# Patient Record
Sex: FEMALE | Race: WHITE | Employment: OTHER | URBAN - METROPOLITAN AREA
[De-identification: names, ages, dates, MRNs, and addresses within clinical notes are randomized per-mention and may not be internally consistent; named-entity substitution may affect disease eponyms.]

---

## 2017-01-12 PROCEDURE — 80053 COMPREHEN METABOLIC PANEL: CPT | Performed by: INTERNAL MEDICINE

## 2017-01-12 PROCEDURE — 36415 COLL VENOUS BLD VENIPUNCTURE: CPT | Performed by: INTERNAL MEDICINE

## 2017-01-12 PROCEDURE — 84443 ASSAY THYROID STIM HORMONE: CPT | Performed by: INTERNAL MEDICINE

## 2017-01-12 PROCEDURE — 80061 LIPID PANEL: CPT | Performed by: INTERNAL MEDICINE

## 2017-07-03 PROCEDURE — 85025 COMPLETE CBC W/AUTO DIFF WBC: CPT | Performed by: INTERNAL MEDICINE

## 2017-07-03 PROCEDURE — 80061 LIPID PANEL: CPT | Performed by: INTERNAL MEDICINE

## 2017-07-03 PROCEDURE — 80053 COMPREHEN METABOLIC PANEL: CPT | Performed by: INTERNAL MEDICINE

## 2017-07-03 PROCEDURE — 36415 COLL VENOUS BLD VENIPUNCTURE: CPT | Performed by: INTERNAL MEDICINE

## 2017-08-22 PROBLEM — R91.8 MULTIPLE PULMONARY NODULES: Status: ACTIVE | Noted: 2017-08-22

## 2017-08-22 PROCEDURE — 80053 COMPREHEN METABOLIC PANEL: CPT | Performed by: INTERNAL MEDICINE

## 2017-08-22 PROCEDURE — 82550 ASSAY OF CK (CPK): CPT | Performed by: INTERNAL MEDICINE

## 2017-08-22 PROCEDURE — 36415 COLL VENOUS BLD VENIPUNCTURE: CPT | Performed by: INTERNAL MEDICINE

## 2017-08-22 PROCEDURE — 85025 COMPLETE CBC W/AUTO DIFF WBC: CPT | Performed by: INTERNAL MEDICINE

## 2017-08-22 PROCEDURE — 84484 ASSAY OF TROPONIN QUANT: CPT | Performed by: INTERNAL MEDICINE

## 2018-01-26 PROBLEM — Z95.1 S/P CABG X 2: Status: ACTIVE | Noted: 2018-01-26

## 2019-10-10 ENCOUNTER — APPOINTMENT (OUTPATIENT)
Dept: GENERAL RADIOLOGY | Facility: HOSPITAL | Age: 79
End: 2019-10-10
Attending: EMERGENCY MEDICINE
Payer: MEDICARE

## 2019-10-10 ENCOUNTER — HOSPITAL ENCOUNTER (EMERGENCY)
Facility: HOSPITAL | Age: 79
Discharge: STILL ADMITTED OR ADMINISTRATIVE | End: 2019-10-10
Attending: EMERGENCY MEDICINE
Payer: MEDICARE

## 2019-10-10 VITALS
SYSTOLIC BLOOD PRESSURE: 118 MMHG | WEIGHT: 133 LBS | DIASTOLIC BLOOD PRESSURE: 84 MMHG | RESPIRATION RATE: 23 BRPM | HEIGHT: 61 IN | HEART RATE: 86 BPM | BODY MASS INDEX: 25.11 KG/M2 | OXYGEN SATURATION: 96 % | TEMPERATURE: 98 F

## 2019-10-10 DIAGNOSIS — R07.9 CHEST PAIN, RULE OUT ACUTE MYOCARDIAL INFARCTION: Primary | ICD-10-CM

## 2019-10-10 PROCEDURE — 84484 ASSAY OF TROPONIN QUANT: CPT | Performed by: EMERGENCY MEDICINE

## 2019-10-10 PROCEDURE — 71045 X-RAY EXAM CHEST 1 VIEW: CPT | Performed by: EMERGENCY MEDICINE

## 2019-10-10 PROCEDURE — 99285 EMERGENCY DEPT VISIT HI MDM: CPT

## 2019-10-10 PROCEDURE — 96365 THER/PROPH/DIAG IV INF INIT: CPT

## 2019-10-10 PROCEDURE — 93005 ELECTROCARDIOGRAM TRACING: CPT

## 2019-10-10 PROCEDURE — 93010 ELECTROCARDIOGRAM REPORT: CPT | Performed by: EMERGENCY MEDICINE

## 2019-10-10 PROCEDURE — 80048 BASIC METABOLIC PNL TOTAL CA: CPT | Performed by: EMERGENCY MEDICINE

## 2019-10-10 PROCEDURE — 85025 COMPLETE CBC W/AUTO DIFF WBC: CPT | Performed by: EMERGENCY MEDICINE

## 2019-10-10 PROCEDURE — 83880 ASSAY OF NATRIURETIC PEPTIDE: CPT | Performed by: EMERGENCY MEDICINE

## 2019-10-10 PROCEDURE — 83735 ASSAY OF MAGNESIUM: CPT | Performed by: EMERGENCY MEDICINE

## 2019-10-10 RX ORDER — NITROGLYCERIN 0.4 MG/1
0.4 TABLET SUBLINGUAL ONCE
Status: COMPLETED | OUTPATIENT
Start: 2019-10-10 | End: 2019-10-10

## 2019-10-10 RX ORDER — NITROGLYCERIN 20 MG/100ML
INJECTION INTRAVENOUS
Status: DISCONTINUED | OUTPATIENT
Start: 2019-10-10 | End: 2019-10-10

## 2019-10-10 NOTE — ED PROVIDER NOTES
Patient Seen in: [facility] AND [facility] Emergency Department      History   Patient presents with:  Chest Pain Angina (cardiovascular)    Stated Complaint: chest pain    HPI    16-year-old female with history including CAD status post CABG and stents, hyperte Smokeless tobacco: Never Used      Tobacco comment: 5 per day currently    Alcohol use: No      Alcohol/week: 0.0 standard drinks    Drug use:  No             Review of Systems    Positive for stated complaint: chest pain  Other systems are as noted in HP GFR, Non- 53 (*)     All other components within normal limits   CBC W/ DIFFERENTIAL - Abnormal; Notable for the following components:    WBC 11.6 (*)     RDW-SD 48.2 (*)     Neutrophil Absolute Prelim 7.87 (*)     Neutrophil Absolute 7. LUNGS/PLEURA: Mild perihilar fullness with bilateral reticulonodular     opacities suggestive of mild interstitial edema. No dense consolidation. There is mild bibasilar atelectasis. BONES: No acute destructive process.  There are degenerative c encounter. MD Discussions or Sign-Outs: Discussed with and accepted for transfer by Dr. Donal Bishop, no further recommendations.     Impression:  After review and interpretation of the above emergency department workup, the patient was found to have Chest p

## 2019-10-10 NOTE — ED NOTES
69year old female here with L sided  chest pain starting after running out the garbage can before the rain, pt reports extensive cardiac, pt received 2nd dose of nitro SL in ED with some relief, Dr. Cori Hardy @ bedside, PIV placed LAC 20 gauge, labs sent,

## 2019-10-10 NOTE — ED INITIAL ASSESSMENT (HPI)
Triage: pt reports L chest pain started while taking garbage cans out, hx of multiple stents and open heart surgery, received 1 nitro in route to ED had some relief but now having pain again

## 2019-10-11 NOTE — ED NOTES
[facility] center contacted  Patient accepted    Room R Maral Busch 2, MD    ACT eta 30mins

## 2019-10-11 NOTE — CM/SW NOTE
Facesheet faxed to [facility] at [facility].

## 2019-10-15 PROBLEM — I21.4 NSTEMI (NON-ST ELEVATED MYOCARDIAL INFARCTION) (HCC): Status: ACTIVE | Noted: 2019-10-15

## 2019-10-15 PROBLEM — I10 ESSENTIAL HYPERTENSION: Status: ACTIVE | Noted: 2019-10-15

## 2019-10-15 PROBLEM — I25.5 ISCHEMIC CARDIOMYOPATHY: Status: ACTIVE | Noted: 2019-10-15

## 2020-06-08 ENCOUNTER — APPOINTMENT (OUTPATIENT)
Dept: GENERAL RADIOLOGY | Facility: HOSPITAL | Age: 80
End: 2020-06-08
Attending: EMERGENCY MEDICINE
Payer: MEDICARE

## 2020-06-08 ENCOUNTER — HOSPITAL ENCOUNTER (EMERGENCY)
Facility: HOSPITAL | Age: 80
Discharge: HOME | End: 2020-06-08
Attending: EMERGENCY MEDICINE
Payer: MEDICARE

## 2020-06-08 VITALS
DIASTOLIC BLOOD PRESSURE: 69 MMHG | SYSTOLIC BLOOD PRESSURE: 146 MMHG | OXYGEN SATURATION: 95 % | TEMPERATURE: 99 F | HEIGHT: 61 IN | HEART RATE: 60 BPM | BODY MASS INDEX: 26.43 KG/M2 | WEIGHT: 140 LBS | RESPIRATION RATE: 16 BRPM

## 2020-06-08 DIAGNOSIS — M25.511 ACUTE PAIN OF RIGHT SHOULDER: Primary | ICD-10-CM

## 2020-06-08 PROCEDURE — 73060 X-RAY EXAM OF HUMERUS: CPT | Performed by: EMERGENCY MEDICINE

## 2020-06-08 PROCEDURE — 73030 X-RAY EXAM OF SHOULDER: CPT | Performed by: EMERGENCY MEDICINE

## 2020-06-08 PROCEDURE — 99283 EMERGENCY DEPT VISIT LOW MDM: CPT

## 2020-06-08 RX ORDER — HYDROCODONE BITARTRATE AND ACETAMINOPHEN 5; 325 MG/1; MG/1
1 TABLET ORAL ONCE
Status: COMPLETED | OUTPATIENT
Start: 2020-06-08 | End: 2020-06-08

## 2020-06-08 RX ORDER — HYDROCODONE BITARTRATE AND ACETAMINOPHEN 5; 325 MG/1; MG/1
1 TABLET ORAL EVERY 6 HOURS PRN
Qty: 10 TABLET | Refills: 0 | Status: SHIPPED | OUTPATIENT
Start: 2020-06-08 | End: 2020-06-15

## 2020-06-08 NOTE — ED PROVIDER NOTES
Patient Seen in: [facility] AND [facility] Emergency Department      History   Patient presents with:  Upper Extremity Injury    Stated Complaint: right shoulder popped now has pain    HPI    44-year-old female presents for complaint of right-sided shoulder pain Smoking status: Former Smoker        Packs/day: 0.80        Years: 55.00        Pack years: 44        Types: Cigarettes      Smokeless tobacco: Never Used      Tobacco comment: 5 per day currently    Alcohol use: No      Alcohol/week: 0.0 standard drin Breath sounds: Normal air entry. Abdominal:      General: Abdomen is flat. Bowel sounds are normal.      Palpations: Abdomen is soft. Musculoskeletal: Normal range of motion. General: No deformity.       Right shoulder: She exhibits tendernes Imaging is negative. She is neurovascularly intact. I do not suspect any arterial or other vascular pathology. There is no evidence for any infectious process. She is neurologically intact. I suspect a rotator cuff injury.   Her burn is mild and healin Clinical impression as well as any lab results and radiology findings were discussed with the patient. Patient is advised to follow up with PCP for reevaluation. Return precautions were given.  Patient voices understanding and agreement with the treatment p

## 2020-06-08 NOTE — CM/SW NOTE
Appointment made with Dr Tony Almazan for 6/9/2020 at 10:15 am in the [facility]

## 2020-06-09 ENCOUNTER — OFFICE VISIT (OUTPATIENT)
Dept: ORTHOPEDICS CLINIC | Facility: CLINIC | Age: 80
End: 2020-06-09
Payer: MEDICARE

## 2020-06-09 VITALS — HEART RATE: 62 BPM | SYSTOLIC BLOOD PRESSURE: 115 MMHG | RESPIRATION RATE: 18 BRPM | DIASTOLIC BLOOD PRESSURE: 62 MMHG

## 2020-06-09 DIAGNOSIS — M75.41 IMPINGEMENT SYNDROME OF RIGHT SHOULDER: Primary | ICD-10-CM

## 2020-06-09 PROCEDURE — 20610 DRAIN/INJ JOINT/BURSA W/O US: CPT | Performed by: ORTHOPAEDIC SURGERY

## 2020-06-09 PROCEDURE — 99203 OFFICE O/P NEW LOW 30 MIN: CPT | Performed by: ORTHOPAEDIC SURGERY

## 2020-06-09 RX ORDER — AMIODARONE HYDROCHLORIDE 200 MG/1
200 TABLET ORAL DAILY
COMMUNITY
Start: 2020-04-13 | End: 2020-06-22

## 2020-06-09 RX ORDER — TRIAMCINOLONE ACETONIDE 40 MG/ML
40 INJECTION, SUSPENSION INTRA-ARTICULAR; INTRAMUSCULAR ONCE
Status: COMPLETED | OUTPATIENT
Start: 2020-06-09 | End: 2020-06-09

## 2020-06-09 RX ORDER — AMIODARONE HYDROCHLORIDE 200 MG/1
200 TABLET ORAL DAILY
COMMUNITY
Start: 2020-01-20 | End: 2021-04-13

## 2020-06-09 RX ADMIN — TRIAMCINOLONE ACETONIDE 40 MG: 40 INJECTION, SUSPENSION INTRA-ARTICULAR; INTRAMUSCULAR at 14:50:00

## 2020-06-09 NOTE — H&P
NURSING INTAKE COMMENTS: Patient presents with:  Shoulder Pain: Right - onset few days ago - she started to have pain - was in ER on 6/8/2020 - has x-rays in the system - no injury - she states she was pulling a lot of weeds from her garden - has a sling o not stated as uncontrolled    • Varicose veins      Past Surgical History:   Procedure Laterality Date   • APPENDECTOMY     • CABG     • HYSTERECTOMY     • TONSILLECTOMY       Current Outpatient Medications   Medication Sig Dispense Refill   • amiodarone H Comment:Intolerant to  Reglan [Metoclopram*        Comment:Tremors  Vasotec [Enalapril]         Comment:cough  Losartan                Coughing  Family History   Problem Relation Age of Onset   • Heart Disorder Father    • Diabetes Father    • Heart Eric Memory deltoid and over Codman's point. There is no anterior posterior shoulder tenderness to palpation. Shoulder range of motion is significantly limited. Passively range of motion is about 100 degrees and further motion is limited by pain.   There is a positi 10/10/2019    GFRAA 61 10/10/2019        Assessment and Plan:  Diagnoses and all orders for this visit:    Impingement syndrome of right shoulder  -     DRAIN/INJECT LARGE JOINT/BURSA  -     triamcinolone acetonide (KENALOG-40) 40 MG/ML injection 40 mg  -

## 2020-06-09 NOTE — PROCEDURES
Patient identified right shoulder as correct procedure site, this was verified with consent and 2 patient identifiers. A timeout was performed. Posterior inferior acromion skin was prepped with chlorhexidine.  Injection site was anesthetized with ethyl chlo

## 2020-06-09 NOTE — PROGRESS NOTES
Per verbal order from Dr. Kirkland , draw up 4ml of 1% lidocaine and 1ml of Kenalog 40 for cortisone injection to right shoulder. Monae Rosas    Patient provided education handout for cortisone injection.    Patient left office without obtaining post injection

## 2020-06-12 ENCOUNTER — TELEPHONE (OUTPATIENT)
Dept: ORTHOPEDICS CLINIC | Facility: CLINIC | Age: 80
End: 2020-06-12

## 2020-06-12 NOTE — TELEPHONE ENCOUNTER
S/w pt and she states she still has right shoulder pain 8/10, pain hasn't improved since injection, but hasn't worsened. She states it's difficult to raise her arm.  I did advise that it can take a week or os before seeing improvement from the cortisone and

## 2020-06-12 NOTE — TELEPHONE ENCOUNTER
I do not recommend continued Norco given risk of dependence and tolerance.   Recommend she follow-up with her PCM to discuss pain management options, particularly if she needs longer term management with Norco.  I recommend rest, ice and XS Tylenol

## 2020-06-12 NOTE — TELEPHONE ENCOUNTER
Per received a shot 6/9//020 on her shoulder, and is still experiencing a lot of pain. 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  Please advise

## 2020-06-12 NOTE — TELEPHONE ENCOUNTER
I advised pt per Dr. Carmine Ocasio message. She states she will try the ES tylenol and if it doesn't help she will call her pcp.  I did advised pt to not take more than 3000mg of tylenol per day and to be cautious as other medications have tylenol/acetaminophen i

## 2020-08-06 PROBLEM — I25.5 ISCHEMIC CARDIOMYOPATHY: Status: ACTIVE | Noted: 2020-08-06

## 2020-08-06 PROBLEM — J43.1 PANLOBULAR EMPHYSEMA (HCC): Status: ACTIVE | Noted: 2020-08-06

## 2020-12-02 ENCOUNTER — LAB ENCOUNTER (OUTPATIENT)
Dept: LAB | Facility: HOSPITAL | Age: 80
End: 2020-12-02
Attending: OPHTHALMOLOGY
Payer: MEDICARE

## 2020-12-02 DIAGNOSIS — H46.9 LEFT OPTIC NEUROPATHY: Primary | ICD-10-CM

## 2020-12-02 PROCEDURE — 36415 COLL VENOUS BLD VENIPUNCTURE: CPT

## 2020-12-02 PROCEDURE — 85652 RBC SED RATE AUTOMATED: CPT

## 2020-12-02 PROCEDURE — 85025 COMPLETE CBC W/AUTO DIFF WBC: CPT

## 2020-12-02 PROCEDURE — 86140 C-REACTIVE PROTEIN: CPT

## 2021-04-13 PROBLEM — I50.22 CHRONIC SYSTOLIC CONGESTIVE HEART FAILURE (HCC): Status: ACTIVE | Noted: 2021-04-13

## 2021-04-20 NOTE — ED INITIAL ASSESSMENT (HPI)
Arrives via ems for left sided chest pain x 1 hour secondary to anxiety related event. Hx 11 stents and open heart surg. Ems ekg showed ST elevation.

## 2021-04-20 NOTE — ED PROVIDER NOTES
Patient Seen in: [facility] AND [facility] Emergency Department    History   Patient presents with:  Chest Pain Angina      HPI    The patient presents to the ED complaining of left-sided chest pain after having a traumatic event occurred.   She states that a dog file    Occupational History      Occupation: retired    Tobacco Use      Smoking status: Former Smoker        Packs/day: 0.80        Years: 55.00        Pack years: 40        Types: Cigarettes      Smokeless tobacco: Never Used      Tobacco comment: 5 per Pulmonary:      Effort: Pulmonary effort is normal. No respiratory distress. Breath sounds: No stridor. Abdominal:      General: There is no distension. Palpations: Abdomen is soft. Musculoskeletal:         General: No deformity.    Skin: Medications - No data to display      MDM      04/19/21 1937 04/19/21 1945 04/19/21 2045   BP: 111/84 111/84 124/81   Pulse: 93 95 87   Resp: 22 21 16   Temp: 98.9 °F (37.2 °C)     TempSrc: Oral     SpO2: 95% 95% 94%   Weight: 63.5 kg       *I personall

## 2021-05-20 ENCOUNTER — APPOINTMENT (OUTPATIENT)
Dept: ULTRASOUND IMAGING | Facility: HOSPITAL | Age: 81
End: 2021-05-20
Attending: EMERGENCY MEDICINE
Payer: MEDICARE

## 2021-05-20 ENCOUNTER — HOSPITAL ENCOUNTER (EMERGENCY)
Facility: HOSPITAL | Age: 81
Discharge: HOME | End: 2021-05-20
Attending: EMERGENCY MEDICINE
Payer: MEDICARE

## 2021-05-20 ENCOUNTER — APPOINTMENT (OUTPATIENT)
Dept: GENERAL RADIOLOGY | Facility: HOSPITAL | Age: 81
End: 2021-05-20
Attending: EMERGENCY MEDICINE
Payer: MEDICARE

## 2021-05-20 VITALS
TEMPERATURE: 98 F | WEIGHT: 140 LBS | DIASTOLIC BLOOD PRESSURE: 70 MMHG | OXYGEN SATURATION: 96 % | SYSTOLIC BLOOD PRESSURE: 138 MMHG | RESPIRATION RATE: 18 BRPM | HEIGHT: 61 IN | BODY MASS INDEX: 26.43 KG/M2 | HEART RATE: 72 BPM

## 2021-05-20 DIAGNOSIS — S83.92XA SPRAIN OF LEFT KNEE, UNSPECIFIED LIGAMENT, INITIAL ENCOUNTER: Primary | ICD-10-CM

## 2021-05-20 PROCEDURE — 93971 EXTREMITY STUDY: CPT | Performed by: EMERGENCY MEDICINE

## 2021-05-20 PROCEDURE — 99284 EMERGENCY DEPT VISIT MOD MDM: CPT

## 2021-05-20 PROCEDURE — 73560 X-RAY EXAM OF KNEE 1 OR 2: CPT | Performed by: EMERGENCY MEDICINE

## 2021-05-20 RX ORDER — HYDROCODONE BITARTRATE AND ACETAMINOPHEN 5; 325 MG/1; MG/1
0.5 TABLET ORAL EVERY 6 HOURS PRN
Qty: 5 TABLET | Refills: 0 | Status: SHIPPED | OUTPATIENT
Start: 2021-05-20

## 2021-05-20 NOTE — ED INITIAL ASSESSMENT (HPI)
Patient with atraumatic left knee pain, worse with walking and change in position. Pain anterior and lateral in nature.

## 2021-07-30 ENCOUNTER — LAB ENCOUNTER (OUTPATIENT)
Dept: LAB | Facility: HOSPITAL | Age: 81
End: 2021-07-30
Payer: MEDICARE

## 2021-07-30 DIAGNOSIS — R52 PAIN: Primary | ICD-10-CM

## 2021-07-30 DIAGNOSIS — R60.0 EDEMA OF RIGHT LOWER LEG: ICD-10-CM

## 2021-07-30 LAB
ANION GAP SERPL CALC-SCNC: 8 MMOL/L (ref 0–18)
BASOPHILS # BLD AUTO: 0.05 X10(3) UL (ref 0–0.2)
BASOPHILS NFR BLD AUTO: 0.4 %
BUN BLD-MCNC: 30 MG/DL (ref 7–18)
BUN/CREAT SERPL: 33 (ref 10–20)
CALCIUM BLD-MCNC: 9.5 MG/DL (ref 8.5–10.1)
CHLORIDE SERPL-SCNC: 105 MMOL/L (ref 98–112)
CO2 SERPL-SCNC: 27 MMOL/L (ref 21–32)
CREAT BLD-MCNC: 0.91 MG/DL
DEPRECATED RDW RBC AUTO: 49 FL (ref 35.1–46.3)
EOSINOPHIL # BLD AUTO: 0.21 X10(3) UL (ref 0–0.7)
EOSINOPHIL NFR BLD AUTO: 1.7 %
ERYTHROCYTE [DISTWIDTH] IN BLOOD BY AUTOMATED COUNT: 13.9 % (ref 11–15)
GLUCOSE BLD-MCNC: 134 MG/DL (ref 70–99)
HCT VFR BLD AUTO: 40.1 %
HGB BLD-MCNC: 12.8 G/DL
IMM GRANULOCYTES # BLD AUTO: 0.07 X10(3) UL (ref 0–1)
IMM GRANULOCYTES NFR BLD: 0.6 %
LYMPHOCYTES # BLD AUTO: 2.39 X10(3) UL (ref 1–4)
LYMPHOCYTES NFR BLD AUTO: 19.7 %
MCH RBC QN AUTO: 30.5 PG (ref 26–34)
MCHC RBC AUTO-ENTMCNC: 31.9 G/DL (ref 31–37)
MCV RBC AUTO: 95.5 FL
MONOCYTES # BLD AUTO: 1.11 X10(3) UL (ref 0.1–1)
MONOCYTES NFR BLD AUTO: 9.1 %
NEUTROPHILS # BLD AUTO: 8.33 X10 (3) UL (ref 1.5–7.7)
NEUTROPHILS # BLD AUTO: 8.33 X10(3) UL (ref 1.5–7.7)
NEUTROPHILS NFR BLD AUTO: 68.5 %
OSMOLALITY SERPL CALC.SUM OF ELEC: 298 MOSM/KG (ref 275–295)
PATIENT FASTING Y/N/NP: YES
PLATELET # BLD AUTO: 225 10(3)UL (ref 150–450)
POTASSIUM SERPL-SCNC: 5 MMOL/L (ref 3.5–5.1)
RBC # BLD AUTO: 4.2 X10(6)UL
SODIUM SERPL-SCNC: 140 MMOL/L (ref 136–145)
URATE SERPL-MCNC: 5.7 MG/DL
WBC # BLD AUTO: 12.2 X10(3) UL (ref 4–11)

## 2021-07-30 PROCEDURE — 36415 COLL VENOUS BLD VENIPUNCTURE: CPT

## 2021-07-30 PROCEDURE — 85025 COMPLETE CBC W/AUTO DIFF WBC: CPT

## 2021-07-30 PROCEDURE — 80048 BASIC METABOLIC PNL TOTAL CA: CPT

## 2021-07-30 PROCEDURE — 84550 ASSAY OF BLOOD/URIC ACID: CPT

## 2021-08-05 ENCOUNTER — HOSPITAL ENCOUNTER (OUTPATIENT)
Dept: GENERAL RADIOLOGY | Facility: HOSPITAL | Age: 81
Discharge: HOME | End: 2021-08-05
Attending: RADIOLOGY
Payer: MEDICARE

## 2021-08-05 ENCOUNTER — HOSPITAL ENCOUNTER (OUTPATIENT)
Dept: MRI IMAGING | Facility: HOSPITAL | Age: 81
Discharge: HOME | End: 2021-08-05
Attending: PODIATRIST
Payer: MEDICARE

## 2021-08-05 DIAGNOSIS — M67.40 GANGLION CYST: ICD-10-CM

## 2021-08-05 DIAGNOSIS — M25.571 PAIN IN RIGHT ANKLE: ICD-10-CM

## 2021-08-05 DIAGNOSIS — M25.579 ANKLE PAIN: ICD-10-CM

## 2021-08-05 DIAGNOSIS — M25.471 EDEMA OF RIGHT ANKLE: ICD-10-CM

## 2021-08-05 DIAGNOSIS — L03.119 ANKLE CELLULITIS: ICD-10-CM

## 2021-08-05 PROCEDURE — 73721 MRI JNT OF LWR EXTRE W/O DYE: CPT | Performed by: PODIATRIST

## 2021-08-05 PROCEDURE — 73600 X-RAY EXAM OF ANKLE: CPT | Performed by: RADIOLOGY

## 2021-08-16 ENCOUNTER — HOSPITAL ENCOUNTER (OUTPATIENT)
Dept: ULTRASOUND IMAGING | Facility: HOSPITAL | Age: 81
Discharge: HOME | End: 2021-08-16
Attending: PODIATRIST
Payer: MEDICARE

## 2021-08-16 DIAGNOSIS — R60.0 EDEMA OF RIGHT LOWER LEG: ICD-10-CM

## 2021-08-16 PROCEDURE — 93971 EXTREMITY STUDY: CPT | Performed by: PODIATRIST

## 2022-04-25 ENCOUNTER — LAB ENCOUNTER (OUTPATIENT)
Dept: LAB | Facility: HOSPITAL | Age: 82
End: 2022-04-25
Attending: INTERNAL MEDICINE
Payer: MEDICARE

## 2022-04-25 DIAGNOSIS — E11.29 TYPE 2 DIABETES MELLITUS WITH MICROALBUMINURIA, WITHOUT LONG-TERM CURRENT USE OF INSULIN (HCC): ICD-10-CM

## 2022-04-25 DIAGNOSIS — Z00.00 ANNUAL PHYSICAL EXAM: ICD-10-CM

## 2022-04-25 DIAGNOSIS — R80.9 TYPE 2 DIABETES MELLITUS WITH MICROALBUMINURIA, WITHOUT LONG-TERM CURRENT USE OF INSULIN (HCC): ICD-10-CM

## 2022-04-25 LAB
ALBUMIN SERPL-MCNC: 3.7 G/DL (ref 3.4–5)
ALBUMIN/GLOB SERPL: 1.2 {RATIO} (ref 1–2)
ALP LIVER SERPL-CCNC: 101 U/L
ALT SERPL-CCNC: 26 U/L
ANION GAP SERPL CALC-SCNC: 5 MMOL/L (ref 0–18)
AST SERPL-CCNC: 20 U/L (ref 15–37)
BASOPHILS # BLD AUTO: 0.04 X10(3) UL (ref 0–0.2)
BASOPHILS NFR BLD AUTO: 0.5 %
BILIRUB SERPL-MCNC: 0.6 MG/DL (ref 0.1–2)
BUN BLD-MCNC: 23 MG/DL (ref 7–18)
BUN/CREAT SERPL: 27.4 (ref 10–20)
CALCIUM BLD-MCNC: 9.2 MG/DL (ref 8.5–10.1)
CHLORIDE SERPL-SCNC: 107 MMOL/L (ref 98–112)
CHOLEST SERPL-MCNC: 150 MG/DL (ref ?–200)
CO2 SERPL-SCNC: 30 MMOL/L (ref 21–32)
CREAT BLD-MCNC: 0.84 MG/DL
CREAT UR-SCNC: 168 MG/DL
DEPRECATED RDW RBC AUTO: 48.1 FL (ref 35.1–46.3)
EOSINOPHIL # BLD AUTO: 0.42 X10(3) UL (ref 0–0.7)
EOSINOPHIL NFR BLD AUTO: 5.3 %
ERYTHROCYTE [DISTWIDTH] IN BLOOD BY AUTOMATED COUNT: 13.5 % (ref 11–15)
EST. AVERAGE GLUCOSE BLD GHB EST-MCNC: 148 MG/DL (ref 68–126)
FASTING PATIENT LIPID ANSWER: YES
FASTING STATUS PATIENT QL REPORTED: YES
GLOBULIN PLAS-MCNC: 3 G/DL (ref 2.8–4.4)
GLUCOSE BLD-MCNC: 128 MG/DL (ref 70–99)
HBA1C MFR BLD: 6.8 % (ref ?–5.7)
HCT VFR BLD AUTO: 41.7 %
HDLC SERPL-MCNC: 57 MG/DL (ref 40–59)
HGB BLD-MCNC: 13.2 G/DL
IMM GRANULOCYTES # BLD AUTO: 0.05 X10(3) UL (ref 0–1)
IMM GRANULOCYTES NFR BLD: 0.6 %
LDLC SERPL CALC-MCNC: 78 MG/DL (ref ?–100)
LYMPHOCYTES # BLD AUTO: 1.45 X10(3) UL (ref 1–4)
LYMPHOCYTES NFR BLD AUTO: 18.2 %
MCH RBC QN AUTO: 30.5 PG (ref 26–34)
MCHC RBC AUTO-ENTMCNC: 31.7 G/DL (ref 31–37)
MCV RBC AUTO: 96.3 FL
MICROALBUMIN UR-MCNC: 3.56 MG/DL
MICROALBUMIN/CREAT 24H UR-RTO: 21.2 UG/MG (ref ?–30)
MONOCYTES # BLD AUTO: 0.75 X10(3) UL (ref 0.1–1)
MONOCYTES NFR BLD AUTO: 9.4 %
NEUTROPHILS # BLD AUTO: 5.25 X10 (3) UL (ref 1.5–7.7)
NEUTROPHILS # BLD AUTO: 5.25 X10(3) UL (ref 1.5–7.7)
NEUTROPHILS NFR BLD AUTO: 66 %
NONHDLC SERPL-MCNC: 93 MG/DL (ref ?–130)
OSMOLALITY SERPL CALC.SUM OF ELEC: 299 MOSM/KG (ref 275–295)
PLATELET # BLD AUTO: 232 10(3)UL (ref 150–450)
POTASSIUM SERPL-SCNC: 4.7 MMOL/L (ref 3.5–5.1)
PROT SERPL-MCNC: 6.7 G/DL (ref 6.4–8.2)
RBC # BLD AUTO: 4.33 X10(6)UL
SODIUM SERPL-SCNC: 142 MMOL/L (ref 136–145)
TRIGL SERPL-MCNC: 80 MG/DL (ref 30–149)
VLDLC SERPL CALC-MCNC: 12 MG/DL (ref 0–30)
WBC # BLD AUTO: 8 X10(3) UL (ref 4–11)

## 2022-04-25 PROCEDURE — 82570 ASSAY OF URINE CREATININE: CPT

## 2022-04-25 PROCEDURE — 83036 HEMOGLOBIN GLYCOSYLATED A1C: CPT

## 2022-04-25 PROCEDURE — 80053 COMPREHEN METABOLIC PANEL: CPT

## 2022-04-25 PROCEDURE — 82043 UR ALBUMIN QUANTITATIVE: CPT

## 2022-04-25 PROCEDURE — 85025 COMPLETE CBC W/AUTO DIFF WBC: CPT

## 2022-04-25 PROCEDURE — 36415 COLL VENOUS BLD VENIPUNCTURE: CPT

## 2022-04-25 PROCEDURE — 80061 LIPID PANEL: CPT

## 2022-12-23 ENCOUNTER — HOSPITAL ENCOUNTER (EMERGENCY)
Facility: HOSPITAL | Age: 82
Discharge: HOME | End: 2022-12-23
Attending: EMERGENCY MEDICINE
Payer: MEDICARE

## 2022-12-23 ENCOUNTER — APPOINTMENT (OUTPATIENT)
Dept: CT IMAGING | Facility: HOSPITAL | Age: 82
End: 2022-12-23
Payer: MEDICARE

## 2022-12-23 VITALS
HEART RATE: 78 BPM | HEIGHT: 61 IN | DIASTOLIC BLOOD PRESSURE: 97 MMHG | OXYGEN SATURATION: 97 % | RESPIRATION RATE: 20 BRPM | WEIGHT: 135 LBS | SYSTOLIC BLOOD PRESSURE: 178 MMHG | BODY MASS INDEX: 25.49 KG/M2 | TEMPERATURE: 98 F

## 2022-12-23 DIAGNOSIS — S09.90XA INJURY OF HEAD, INITIAL ENCOUNTER: Primary | ICD-10-CM

## 2022-12-23 DIAGNOSIS — S00.03XA HEMATOMA OF FRONTAL SCALP, INITIAL ENCOUNTER: ICD-10-CM

## 2022-12-23 PROCEDURE — 99284 EMERGENCY DEPT VISIT MOD MDM: CPT

## 2022-12-23 PROCEDURE — 70450 CT HEAD/BRAIN W/O DYE: CPT | Performed by: EMERGENCY MEDICINE

## 2022-12-23 RX ORDER — TRAMADOL HYDROCHLORIDE 50 MG/1
50 TABLET ORAL EVERY 6 HOURS PRN
Qty: 10 TABLET | Refills: 0 | Status: SHIPPED | OUTPATIENT
Start: 2022-12-23 | End: 2022-12-30

## 2022-12-23 NOTE — ED INITIAL ASSESSMENT (HPI)
PT TO ER AFTER TRIPPING AND FALLING WHILE SHOVELING SNOW. PT IS ON PLAVIX, DENIES LOC. SWELLING NOTED TO RIGHT SIDE OF PTS FOREHEAD.

## 2022-12-23 NOTE — ED QUICK NOTES
PATIENT APPROVED FOR DISCHARGE PER ER PROVIDER. PATIENT GIVEN VERBAL AND WRITTEN DISCHARGE INSTRUCTIONS. GIVEN INSTRUCTIONS ON RX X 1, FOLLOW UP WITH PCP AND SYMPTOMS THAT NECESSITATE COMING BACK TO ED. VERBALIZES UNDERSTANDING OF DISCHARGE INSTRUCTIONS. PATIENT AOX3 OUT OF ED WITH STEADY GAIT. RESP UNLABORED.

## 2023-02-19 ENCOUNTER — APPOINTMENT (OUTPATIENT)
Dept: GENERAL RADIOLOGY | Facility: HOSPITAL | Age: 83
End: 2023-02-19
Attending: EMERGENCY MEDICINE
Payer: MEDICARE

## 2023-02-19 ENCOUNTER — HOSPITAL ENCOUNTER (EMERGENCY)
Facility: HOSPITAL | Age: 83
Discharge: HOME | End: 2023-02-19
Attending: EMERGENCY MEDICINE
Payer: MEDICARE

## 2023-02-19 VITALS
OXYGEN SATURATION: 98 % | HEIGHT: 61 IN | DIASTOLIC BLOOD PRESSURE: 77 MMHG | SYSTOLIC BLOOD PRESSURE: 127 MMHG | RESPIRATION RATE: 20 BRPM | HEART RATE: 69 BPM | TEMPERATURE: 99 F | BODY MASS INDEX: 25.49 KG/M2 | WEIGHT: 135 LBS

## 2023-02-19 DIAGNOSIS — S39.012A STRAIN OF LUMBAR REGION, INITIAL ENCOUNTER: Primary | ICD-10-CM

## 2023-02-19 DIAGNOSIS — M16.10 HIP ARTHRITIS: ICD-10-CM

## 2023-02-19 PROCEDURE — 73502 X-RAY EXAM HIP UNI 2-3 VIEWS: CPT | Performed by: EMERGENCY MEDICINE

## 2023-02-19 PROCEDURE — 99284 EMERGENCY DEPT VISIT MOD MDM: CPT

## 2023-02-19 PROCEDURE — 72100 X-RAY EXAM L-S SPINE 2/3 VWS: CPT | Performed by: EMERGENCY MEDICINE

## 2023-02-19 PROCEDURE — 99283 EMERGENCY DEPT VISIT LOW MDM: CPT

## 2023-02-19 RX ORDER — IBUPROFEN 600 MG/1
600 TABLET ORAL ONCE
Status: DISCONTINUED | OUTPATIENT
Start: 2023-02-19 | End: 2023-02-19

## 2023-02-19 RX ORDER — TRAMADOL HYDROCHLORIDE 50 MG/1
50 TABLET ORAL EVERY 12 HOURS PRN
Qty: 10 TABLET | Refills: 0 | Status: SHIPPED | OUTPATIENT
Start: 2023-02-19 | End: 2023-02-24

## 2023-02-19 RX ORDER — PREDNISONE 20 MG/1
20 TABLET ORAL DAILY
Qty: 5 TABLET | Refills: 0 | Status: SHIPPED | OUTPATIENT
Start: 2023-02-19 | End: 2023-02-24

## 2023-02-19 RX ORDER — ACETAMINOPHEN 325 MG/1
650 TABLET ORAL ONCE
Status: COMPLETED | OUTPATIENT
Start: 2023-02-19 | End: 2023-02-19

## 2023-02-19 NOTE — ED INITIAL ASSESSMENT (HPI)
Patient arrives ambulatory through triage with complaints of right hip and lower back pain since Wednesday. Patient denies any injury/trauma but did do a lot of walking that day. Tylenol used with little relief.

## 2023-04-06 ENCOUNTER — APPOINTMENT (OUTPATIENT)
Dept: CT IMAGING | Facility: HOSPITAL | Age: 83
End: 2023-04-06
Attending: STUDENT IN AN ORGANIZED HEALTH CARE EDUCATION/TRAINING PROGRAM
Payer: MEDICARE

## 2023-04-06 ENCOUNTER — HOSPITAL ENCOUNTER (EMERGENCY)
Facility: HOSPITAL | Age: 83
Discharge: HOME | End: 2023-04-06
Attending: STUDENT IN AN ORGANIZED HEALTH CARE EDUCATION/TRAINING PROGRAM
Payer: MEDICARE

## 2023-04-06 ENCOUNTER — APPOINTMENT (OUTPATIENT)
Dept: GENERAL RADIOLOGY | Facility: HOSPITAL | Age: 83
End: 2023-04-06
Attending: STUDENT IN AN ORGANIZED HEALTH CARE EDUCATION/TRAINING PROGRAM
Payer: MEDICARE

## 2023-04-06 VITALS
RESPIRATION RATE: 20 BRPM | OXYGEN SATURATION: 98 % | HEART RATE: 63 BPM | TEMPERATURE: 98 F | SYSTOLIC BLOOD PRESSURE: 137 MMHG | DIASTOLIC BLOOD PRESSURE: 66 MMHG

## 2023-04-06 DIAGNOSIS — J40 BRONCHITIS: Primary | ICD-10-CM

## 2023-04-06 DIAGNOSIS — R91.1 PULMONARY NODULE: ICD-10-CM

## 2023-04-06 LAB
ANION GAP SERPL CALC-SCNC: 6 MMOL/L (ref 0–18)
BASOPHILS # BLD AUTO: 0.03 X10(3) UL (ref 0–0.2)
BASOPHILS NFR BLD AUTO: 0.3 %
BUN BLD-MCNC: 18 MG/DL (ref 7–18)
BUN/CREAT SERPL: 24 (ref 10–20)
CALCIUM BLD-MCNC: 9.1 MG/DL (ref 8.5–10.1)
CHLORIDE SERPL-SCNC: 106 MMOL/L (ref 98–112)
CO2 SERPL-SCNC: 28 MMOL/L (ref 21–32)
CREAT BLD-MCNC: 0.75 MG/DL
DEPRECATED RDW RBC AUTO: 45 FL (ref 35.1–46.3)
EOSINOPHIL # BLD AUTO: 0.14 X10(3) UL (ref 0–0.7)
EOSINOPHIL NFR BLD AUTO: 1.5 %
ERYTHROCYTE [DISTWIDTH] IN BLOOD BY AUTOMATED COUNT: 13.2 % (ref 11–15)
FLUAV + FLUBV RNA SPEC NAA+PROBE: NEGATIVE
FLUAV + FLUBV RNA SPEC NAA+PROBE: NEGATIVE
GFR SERPLBLD BASED ON 1.73 SQ M-ARVRAT: 80 ML/MIN/1.73M2 (ref 60–?)
GLUCOSE BLD-MCNC: 168 MG/DL (ref 70–99)
HCT VFR BLD AUTO: 39.6 %
HGB BLD-MCNC: 12.8 G/DL
IMM GRANULOCYTES # BLD AUTO: 0.04 X10(3) UL (ref 0–1)
IMM GRANULOCYTES NFR BLD: 0.4 %
LYMPHOCYTES # BLD AUTO: 1.64 X10(3) UL (ref 1–4)
LYMPHOCYTES NFR BLD AUTO: 18 %
MCH RBC QN AUTO: 30 PG (ref 26–34)
MCHC RBC AUTO-ENTMCNC: 32.3 G/DL (ref 31–37)
MCV RBC AUTO: 93 FL
MONOCYTES # BLD AUTO: 0.88 X10(3) UL (ref 0.1–1)
MONOCYTES NFR BLD AUTO: 9.7 %
NEUTROPHILS # BLD AUTO: 6.36 X10 (3) UL (ref 1.5–7.7)
NEUTROPHILS # BLD AUTO: 6.36 X10(3) UL (ref 1.5–7.7)
NEUTROPHILS NFR BLD AUTO: 70.1 %
OSMOLALITY SERPL CALC.SUM OF ELEC: 296 MOSM/KG (ref 275–295)
PLATELET # BLD AUTO: 215 10(3)UL (ref 150–450)
POTASSIUM SERPL-SCNC: 4.4 MMOL/L (ref 3.5–5.1)
RBC # BLD AUTO: 4.26 X10(6)UL
RSV RNA SPEC NAA+PROBE: NEGATIVE
S PYO AG THROAT QL: NEGATIVE
SARS-COV-2 RNA RESP QL NAA+PROBE: NOT DETECTED
SODIUM SERPL-SCNC: 140 MMOL/L (ref 136–145)
WBC # BLD AUTO: 9.1 X10(3) UL (ref 4–11)

## 2023-04-06 PROCEDURE — 96374 THER/PROPH/DIAG INJ IV PUSH: CPT

## 2023-04-06 PROCEDURE — 0241U SARS-COV-2/FLU A AND B/RSV BY PCR (GENEXPERT): CPT | Performed by: STUDENT IN AN ORGANIZED HEALTH CARE EDUCATION/TRAINING PROGRAM

## 2023-04-06 PROCEDURE — 99284 EMERGENCY DEPT VISIT MOD MDM: CPT

## 2023-04-06 PROCEDURE — 71260 CT THORAX DX C+: CPT | Performed by: STUDENT IN AN ORGANIZED HEALTH CARE EDUCATION/TRAINING PROGRAM

## 2023-04-06 PROCEDURE — 85025 COMPLETE CBC W/AUTO DIFF WBC: CPT | Performed by: STUDENT IN AN ORGANIZED HEALTH CARE EDUCATION/TRAINING PROGRAM

## 2023-04-06 PROCEDURE — 87880 STREP A ASSAY W/OPTIC: CPT

## 2023-04-06 PROCEDURE — 80048 BASIC METABOLIC PNL TOTAL CA: CPT | Performed by: STUDENT IN AN ORGANIZED HEALTH CARE EDUCATION/TRAINING PROGRAM

## 2023-04-06 PROCEDURE — 71045 X-RAY EXAM CHEST 1 VIEW: CPT | Performed by: STUDENT IN AN ORGANIZED HEALTH CARE EDUCATION/TRAINING PROGRAM

## 2023-04-06 RX ORDER — AZITHROMYCIN 250 MG/1
TABLET, FILM COATED ORAL
Qty: 6 TABLET | Refills: 0 | Status: SHIPPED | OUTPATIENT
Start: 2023-04-06 | End: 2023-04-11

## 2023-04-06 RX ORDER — KETOROLAC TROMETHAMINE 15 MG/ML
15 INJECTION, SOLUTION INTRAMUSCULAR; INTRAVENOUS ONCE
Status: COMPLETED | OUTPATIENT
Start: 2023-04-06 | End: 2023-04-06

## 2023-04-06 RX ORDER — BENZONATATE 100 MG/1
100 CAPSULE ORAL 3 TIMES DAILY PRN
Qty: 30 CAPSULE | Refills: 0 | Status: SHIPPED | OUTPATIENT
Start: 2023-04-06 | End: 2023-05-06

## 2023-04-06 NOTE — ED QUICK NOTES
Patient safe to DC home per MD. Donavan Angel to dress self. DC teaching done, instructions reviewed with patient including when and how to follow up with healthcare providers and when to seek emergency care. The patient verbalizes understanding. Peripheral IV discontinued. Patient ambulatory with steady gait to exit.

## 2023-11-15 NOTE — ED PROVIDER NOTES
----- Message from Nicholas Morales MD sent at 11/15/2023  4:30 PM CST -----  Not diabetic yet, although blood sugar is almost to prediabetic level   Patient Seen in: [facility] Department      History   Patient presents with:  Knee Pain    Stated Complaint: L knee pain     HPI/Subjective:   HPI    69-year-old female prior remote history of injection of the left knee for pain here wit Review of Systems    Positive for stated complaint: L knee pain   Other systems are as noted in HPI. Constitutional and vital signs reviewed. All other systems reviewed and negative except as noted above.     Physical Exam     ED Triage Vitals [ space narrowing is evident. There is reciprocal marginal osteophyte formation arising from the medial femoral condyles and tibial plateaus. SOFT TISSUES: Negative for visible soft tissue swelling or radiopaque foreign body. EFFUSION: None visible.   OTHER: here with mild discomfort. She was placed in an Ace wrap.   She knows to return with any worsening or change                             Disposition and Plan     Clinical Impression:  Sprain of left knee, unspecified ligament, initial encounter  (primary e

## 2024-03-04 ENCOUNTER — OFFICE VISIT (OUTPATIENT)
Dept: PHYSICAL MEDICINE AND REHAB | Facility: CLINIC | Age: 84
End: 2024-03-04
Payer: MEDICARE

## 2024-03-04 VITALS — HEIGHT: 61 IN | BODY MASS INDEX: 25.49 KG/M2 | HEART RATE: 94 BPM | OXYGEN SATURATION: 92 % | WEIGHT: 135 LBS

## 2024-03-04 DIAGNOSIS — I10 ESSENTIAL HYPERTENSION: ICD-10-CM

## 2024-03-04 DIAGNOSIS — M54.50 CHRONIC BILATERAL LOW BACK PAIN WITHOUT SCIATICA: Primary | ICD-10-CM

## 2024-03-04 DIAGNOSIS — G89.29 CHRONIC BILATERAL LOW BACK PAIN WITHOUT SCIATICA: Primary | ICD-10-CM

## 2024-03-04 DIAGNOSIS — Z95.1 S/P CABG X 2: ICD-10-CM

## 2024-03-04 DIAGNOSIS — E11.29 TYPE 2 DIABETES MELLITUS WITH MICROALBUMINURIA, WITHOUT LONG-TERM CURRENT USE OF INSULIN (HCC): ICD-10-CM

## 2024-03-04 DIAGNOSIS — R80.9 TYPE 2 DIABETES MELLITUS WITH MICROALBUMINURIA, WITHOUT LONG-TERM CURRENT USE OF INSULIN (HCC): ICD-10-CM

## 2024-03-04 DIAGNOSIS — M54.16 LUMBAR RADICULOPATHY: ICD-10-CM

## 2024-03-04 PROBLEM — M48.061 SPINAL STENOSIS OF LUMBAR REGION WITHOUT NEUROGENIC CLAUDICATION: Status: ACTIVE | Noted: 2024-03-04

## 2024-03-04 PROCEDURE — 99204 OFFICE O/P NEW MOD 45 MIN: CPT | Performed by: PHYSICAL MEDICINE & REHABILITATION

## 2024-03-04 NOTE — PROGRESS NOTES
Low Back Pain H & P    Chief Complaint:    Chief Complaint   Patient presents with    New Patient     New patient is here with complaints of low back pain and was referred by Dr. Green. States the pain started 6 months ago and was on off. Reports pain to be a constant aching pain and when it gets bad, she lays down to relax it. Takes tylenol 625 BID.  No injections or physical therapy completed for back. MRI of back was completed in June at [facility]. Pain 7/10       HPI:  Annita Mendieta is a 83 year old year old right handed female with a prior history of low back pain.  She was treated with a L-S corset in the past which helped for about one month.  She was 19 when she had the corset.  [facility]liver and he had her get a MRI scan.  She did not follow up there.  Her daughter recommended that she sees me.      Description of the Pain  The pain is located in the bilateral and central low back.  The pain might be slightly worse on the right side.  The pain does not radiate. She did have right lateral hip pain which was diagnosed as a bursitis.  This was injected with steroid and the pain resolved.  This was done 3 months ago.  The pain at its best is 5/10. The pain at its worst is 7/10. The pain is currently  6/10.  The pain is described as a(n) aching and dull sensation.    The pain is worse sitting, standing, walking, going from sitting to standing, in the morning, in the afternoon, and in the evening.    The pain is better  somewhat with Tylenol.  Laying down is the best .  There is no numbness.  There is no tingling in the legs.  There is weakness in both legs.  She will get cramping in the left calf muscle.    Past Medical History   Past Medical History:   Diagnosis Date    Atherosclerosis of coronary artery     Castañeda's esophagus without dysplasia 11/21/2016    CAD (coronary artery disease)     s/p stent placement 2004;77 year old female with PMHx CAD s/p PCI in 2004/2005, s/p CABG  2V (LIMA-RI, GEORGINA-RCA) 8/2017, s/p GERARDO to LM 3/22/2018, recent LHC on 3/29/2018 s/p POBA to pRCA who presents for staged PCI of RCA.    Essential hypertension     GERD (gastroesophageal reflux disease)     H/O hammer toe correction     HTN (hypertension)     Hyperlipidemia     Osteoarthritis     S/P appendectomy     S/p bilateral blepharoplasty     S/P CABG x 2     S/P cholecystectomy     S/P tonsillectomy and adenoidectomy     SNHL (sensorineural hearing loss)     bilateral hearing aids    Tobacco use     Type II or unspecified type diabetes mellitus without mention of complication, not stated as uncontrolled     Varicose veins        Past Surgical History   Past Surgical History:   Procedure Laterality Date    APPENDECTOMY      APPENDECTOMY      CABG      HYSTERECTOMY      REMOVAL GALLBLADDER      TONSILLECTOMY         Family History   Family History   Problem Relation Age of Onset    Heart Disorder Father     Diabetes Father     Heart Disorder Mother     Hypertension Daughter     Lipids Daughter     Obesity Daughter     Diabetes Daughter        Social History   Social History     Socioeconomic History    Marital status:      Spouse name: Not on file    Number of children: Not on file    Years of education: Not on file    Highest education level: Not on file   Occupational History    Occupation: retired   Tobacco Use    Smoking status: Former     Packs/day: 0.80     Years: 55.00     Additional pack years: 0.00     Total pack years: 44.00     Types: Cigarettes    Smokeless tobacco: Never    Tobacco comments:     5 per day currently   Vaping Use    Vaping Use: Never used   Substance and Sexual Activity    Alcohol use: No     Alcohol/week: 0.0 standard drinks of alcohol    Drug use: No    Sexual activity: Not Currently   Other Topics Concern     Service Not Asked    Blood Transfusions Not Asked    Caffeine Concern Not Asked    Occupational Exposure Not Asked    Hobby Hazards Not Asked    Sleep Concern  Not Asked    Stress Concern Not Asked    Weight Concern Not Asked    Special Diet Not Asked    Back Care Not Asked    Exercise Yes    Bike Helmet Not Asked    Seat Belt Not Asked    Self-Exams Not Asked   Social History Narrative    .  Lives alone.    25 grandchildren.     Social Determinants of Health     Financial Resource Strain: Not on file   Food Insecurity: Not on file   Transportation Needs: Not on file   Physical Activity: Not on file   Stress: Not on file   Social Connections: Not on file   Housing Stability: Not on file       Review of Systems  Patient-reported ROS  Constitutional  Sleep Disturbance: admits  Chills: denies  Fever: denies  Weight Gain: denies  Weight Loss: denies   Cardiovascular  Chest Pain: denies  Irregular Heartbeat: denies   Respiratory  Painful Breathing: denies  Wheezing: denies   Gastrointestinal  Bowel Incontinence: denies  Heartburn: denies  Abdominal Pain: denies  Blood in Stool : denies  Rectal Pain: denies   Hematology  Easy Bruising: admits  Easy Bleeding: admits   Genitourinary  Difficulty Urinating: denies  Bladder Incontinence: denies  Pelvic Pain: denies  Painful Urination: denies   Musculoskeletal  Joint Stiffness: admits  Painful Joints: admits  Tailbone Pain: denies  Swollen Joints: denies   Peripheral Vascular  Swelling of Legs/Feet: denies  Cold Extremities: denies   Skin  Open Sores: denies  Nodules or Lumps: denies  Rash: denies   Neurological  Loss of Strength Since last Visit: denies  Tingling/Numbness: denies  Balance: admits   Psychiatric  Anxiety: denies  Depressed Mood: denies        PE:  The patient does appear in her stated age in no distress.  The patient is well groomed.    Psychiatric:  The patient is alert and oriented x 3.  The patient has a normal affect and mood.      Respiratory:  No acute respiratory distress. Patient does not have a cough.    HEENT:  Extraocular muscles are intact. There is no kern icterus. Pupils are equal, round, and  reactive to light. No redness or discharge bilaterally.    Skin:  There are no rashes or lesions.    Vitals:  Vitals:    03/04/24 1045   Pulse: 94       Gait:    Gait: Normal gait   Sit to Stand: no difficulty   RIGHT Walking on Toes: no difficulty   LEFT Walking on Toes: no difficulty   RIGHT Walking on Heels: no difficulty   LEFT Walking on Heels: no difficulty     Lumbar Spine:    Scoliosis: Thoracic levoscoliosis that is moderate and Lumbar dextroscoliosis that is moderate with moderate thoracic kyphosis   Lumbar Flexion: 40 degrees Gives the patient pain in the bilateral low back.   Lumbar Extension: 10 degrees Gives the patient pain in the bilateral low back.     Lumbar Spine Palpation:    Spinous Processes: Non-tender for all Spinous Processes   Z-joints: Non-tender for all Z-joints   SIJ: Non-tender for bilateral SIJ   Piriformis Muscle: Non-tender bilateral Piriformis muscles   Greater Trochanteric Bursa: Non-tender for bilateral Greater Trochanteric Bursa     Vascular lower extremity:   Dorsalis pedis pulse-RIGHT 2+   Dorsalis pedis pulse-LEFT 2+   Tibialis posterior pulse-RIGHT 2+   Tibialis posterior pulse-LEFT 2+      Neurological Lower Extremity:    Light Touch Sensation: Intact in bilateral Lower Extremities   LE Muscle Strength: All LE strength measurements 5/5 except:  Hamstring RIGHT:   4-/5  Hamstring LEFT:   4-/5   RIGHT plantar reflexes: downward response   LEFT plantar reflexes: downward response   Reflexes: 2+ in bilateral lower extremities     Hip: Hips are stable.    RIGHT hip ROM moderate-severely limited by bilateral low back pain   LEFT hip ROM moderately limited by pain in the bilateral low back   RIGHT hip flexion Negative pain   LEFT hip flexion Negative pain   RIGHT hip MOIZ test Positive for bilateral low back pain   LEFT hip MOIZ test Positive for bilateral low back pain   RIGHT hip internal rotation Positive for bilateral low back pain   LEFT hip internal rotation Positive for  bilateral low back pain     Neural Tension Tests Lumbar Spine:  Sitting straight leg raise-RIGHT Negative for pain   Sitting straight leg raise-LEFT Negative for pain   Supine straight leg raise-RIGHT Negative for pain   Supine straight leg raise-LEFT Positive for bilateral low back pain and at 70 degrees   Slump test-RIGHT Negative for pain   Slump test-LEFT Negative for pain     Lymph Nodes:   Inguinal Lymph Nodes Absent     Radiology Imaging:  I reviewed with the patient her X-ray and MRI reports of the lumbar spine and hip right from 2/19/2023 and 6/2/2023.      Assessment  1. Chronic bilateral low back pain without sciatica    2. bilateral L5-S1 radiculopathy    3. Type 2 diabetes mellitus with microalbuminuria, without long-term current use of insulin (HCC)    4. S/P CABG x 2    5. Essential hypertension      Plan  She will get me a copy of the MRI that she had of the lumbar spine last year so that I can review it and decide which injection would be best for her to have.    She will get started with the PT.    She will follow up in 3 months, but will let me know 2 weeks after the injection how she is feeling.    The patient understands and agrees with the stated plan.    Derrick Srinivasan MD  3/4/2024

## 2024-03-04 NOTE — PATIENT INSTRUCTIONS
Plan  She will get me a copy of the MRI that she had of the lumbar spine last year so that I can review it and decide which injection would be best for her to have.    She will get started with the PT.    She will follow up in 3 months, but will let me know 2 weeks after the injection how she is feeling.

## 2024-03-05 ENCOUNTER — TELEPHONE (OUTPATIENT)
Dept: PHYSICAL MEDICINE AND REHAB | Facility: CLINIC | Age: 84
End: 2024-03-05

## 2024-03-05 DIAGNOSIS — M54.16 LUMBAR RADICULOPATHY: Primary | ICD-10-CM

## 2024-03-06 ENCOUNTER — MED REC SCAN ONLY (OUTPATIENT)
Dept: PHYSICAL MEDICINE AND REHAB | Facility: CLINIC | Age: 84
End: 2024-03-06

## 2024-03-06 NOTE — TELEPHONE ENCOUNTER
LUMBAR MRI (completed on 06/02/2023) CD received & uploaded to PACS at this time.   Written records copied & placed in Dr. Srinivasan's bin for his review. MD can send to scanning once review is completed.    Original copy of records & CD mailed to patient via [employer] with given address on file/drop off form.     Confirmed to be viewable. Will forward to Dr. Srinivasan for his review/recommendations.

## 2024-03-08 PROBLEM — M51.9 LUMBAR DISC DISEASE: Status: ACTIVE | Noted: 2024-03-08

## 2024-03-08 PROBLEM — M43.10 RETROLISTHESIS: Status: ACTIVE | Noted: 2024-03-08

## 2024-03-08 PROBLEM — M48.061 LUMBAR FORAMINAL STENOSIS: Status: ACTIVE | Noted: 2024-03-08

## 2024-03-11 ENCOUNTER — TELEPHONE (OUTPATIENT)
Dept: PHYSICAL MEDICINE AND REHAB | Facility: CLINIC | Age: 84
End: 2024-03-11

## 2024-03-11 NOTE — TELEPHONE ENCOUNTER
Per CMS Guidelines -no authorization is required for Bilateral L5 TFESI CPT 71975-59     Status: Authorization is not required based on medical necessity however may be subject to review once claim is submitted-Covered Benefit

## 2024-03-11 NOTE — TELEPHONE ENCOUNTER
Antioag letter faxed to Cardio- Dr.Bruce Connor Fax #: [phone].     Status of Anticoag  [x] Clearance pending - clearance needed to hold Plavix for 7 days prior to injection.  [] Clearance approved  [] Clearance denied

## 2024-04-02 PROBLEM — I70.0 AORTIC ATHEROSCLEROSIS (HCC): Status: ACTIVE | Noted: 2023-09-25

## 2024-04-02 PROBLEM — C34.91 MALIGNANT NEOPLASM OF RIGHT LUNG (HCC): Status: ACTIVE | Noted: 2022-09-19

## 2024-04-02 PROBLEM — R07.9 CHEST PAIN: Status: ACTIVE | Noted: 2024-04-02

## 2024-04-02 PROBLEM — I70.0 AORTIC ATHEROSCLEROSIS: Status: ACTIVE | Noted: 2023-09-25

## 2024-04-11 ENCOUNTER — TELEPHONE (OUTPATIENT)
Dept: PHYSICAL THERAPY | Facility: HOSPITAL | Age: 84
End: 2024-04-11

## 2024-04-29 ENCOUNTER — APPOINTMENT (OUTPATIENT)
Dept: PHYSICAL THERAPY | Facility: HOSPITAL | Age: 84
End: 2024-04-29
Attending: PHYSICAL MEDICINE & REHABILITATION
Payer: MEDICARE

## 2024-05-02 ENCOUNTER — APPOINTMENT (OUTPATIENT)
Dept: PHYSICAL THERAPY | Facility: HOSPITAL | Age: 84
End: 2024-05-02
Attending: PHYSICAL MEDICINE & REHABILITATION
Payer: MEDICARE

## 2024-05-06 ENCOUNTER — APPOINTMENT (OUTPATIENT)
Dept: PHYSICAL THERAPY | Facility: HOSPITAL | Age: 84
End: 2024-05-06
Attending: PHYSICAL MEDICINE & REHABILITATION
Payer: MEDICARE

## 2024-05-09 ENCOUNTER — APPOINTMENT (OUTPATIENT)
Dept: PHYSICAL THERAPY | Facility: HOSPITAL | Age: 84
End: 2024-05-09
Attending: PHYSICAL MEDICINE & REHABILITATION
Payer: MEDICARE

## 2024-05-09 ENCOUNTER — OFFICE VISIT (OUTPATIENT)
Dept: PHYSICAL MEDICINE AND REHAB | Facility: CLINIC | Age: 84
End: 2024-05-09
Payer: MEDICARE

## 2024-05-09 ENCOUNTER — TELEPHONE (OUTPATIENT)
Dept: PHYSICAL MEDICINE AND REHAB | Facility: CLINIC | Age: 84
End: 2024-05-09

## 2024-05-09 DIAGNOSIS — M43.10 RETROLISTHESIS: ICD-10-CM

## 2024-05-09 DIAGNOSIS — I50.22 CHRONIC SYSTOLIC CONGESTIVE HEART FAILURE (HCC): ICD-10-CM

## 2024-05-09 DIAGNOSIS — R80.9 TYPE 2 DIABETES MELLITUS WITH MICROALBUMINURIA (HCC): ICD-10-CM

## 2024-05-09 DIAGNOSIS — M54.16 LUMBAR RADICULOPATHY: Primary | ICD-10-CM

## 2024-05-09 DIAGNOSIS — G89.29 CHRONIC BILATERAL LOW BACK PAIN WITHOUT SCIATICA: ICD-10-CM

## 2024-05-09 DIAGNOSIS — M51.9 LUMBAR DISC DISEASE: ICD-10-CM

## 2024-05-09 DIAGNOSIS — M54.50 CHRONIC BILATERAL LOW BACK PAIN WITHOUT SCIATICA: ICD-10-CM

## 2024-05-09 DIAGNOSIS — I10 ESSENTIAL HYPERTENSION: ICD-10-CM

## 2024-05-09 DIAGNOSIS — M48.061 LUMBAR FORAMINAL STENOSIS: ICD-10-CM

## 2024-05-09 DIAGNOSIS — M48.061 SPINAL STENOSIS OF LUMBAR REGION WITHOUT NEUROGENIC CLAUDICATION: ICD-10-CM

## 2024-05-09 DIAGNOSIS — K22.70 BARRETT'S ESOPHAGUS WITHOUT DYSPLASIA: ICD-10-CM

## 2024-05-09 DIAGNOSIS — E11.29 TYPE 2 DIABETES MELLITUS WITH MICROALBUMINURIA (HCC): ICD-10-CM

## 2024-05-09 PROCEDURE — 99214 OFFICE O/P EST MOD 30 MIN: CPT | Performed by: PHYSICAL MEDICINE & REHABILITATION

## 2024-05-09 RX ORDER — ALBUTEROL SULFATE 90 UG/1
2 AEROSOL, METERED RESPIRATORY (INHALATION) EVERY 4 HOURS PRN
COMMUNITY
Start: 2024-04-10

## 2024-05-09 RX ORDER — FLUTICASONE FUROATE AND VILANTEROL TRIFENATATE 100; 25 UG/1; UG/1
1 POWDER RESPIRATORY (INHALATION) DAILY
COMMUNITY
Start: 2024-04-11

## 2024-05-09 NOTE — PROGRESS NOTES
Low Back Pain H & P    Chief Complaint:   Chief Complaint   Patient presents with    Low Back Pain     LOV 3/04/2024. Pt had respiratory issues & had to miss her injection.  Patient placed on breo & albuterol inhalers. Pt reports LBP has worsened.  Pain is 7/10 constant deep ache and also sharp occasional. Has tried lido patches, creams, tylenol, \"nothing is working\"     Nursing note reviewed and verified.    Patient was last seen on 3/4/2024.  She states that about 6 weeks ago, she had some difficulty breathing and had testing which resulted in her being diagnosed with bronchial spasms. She has been give 2 different inhalers which have helped her.  She was not able to start the PT and she was not maribel to have the bilateral L5 TFESI's due to the bronchial spasms.  She would like to schedule this again.    Description of the Pain  The pain is located in the bilateral low back.    The pain radiates to the bilateral buttock..  The pain at its best is 5/10. The pain at its worst is 10/10. The pain is currently  7/10.  The pain is described as a(n) aching sensation.    The pain is worse sitting, standing, walking, in the morning, in the afternoon, and in the evening.  The rainy weather will increase her pain.  The pain is better  somewhat with Tylenol .  There is no numbness.  There is no tingling in the legs.  There is not weakness in both legs.     Past Medical History   Past Medical History:    Atherosclerosis of coronary artery    Castañeda's esophagus without dysplasia    CAD (coronary artery disease)    s/p stent placement 2004;77 year old female with PMHx CAD s/p PCI in 2004/2005, s/p CABG 2V (LIMA-RI, GEORGINA-RCA) 8/2017, s/p GERARDO to LM 3/22/2018, recent LHC on 3/29/2018 s/p POBA to pRCA who presents for staged PCI of RCA.    Congestive heart disease (HCC)    Coronary atherosclerosis    Essential hypertension    GERD (gastroesophageal reflux disease)    H/O hammer toe correction    Hearing impairment    High blood  pressure    High cholesterol    HTN (hypertension)    Hyperlipidemia    Osteoarthritis    S/P appendectomy    S/p bilateral blepharoplasty    S/P CABG x 2    S/P cholecystectomy    S/P tonsillectomy and adenoidectomy    SNHL (sensorineural hearing loss)    bilateral hearing aids    Tobacco use    Type II or unspecified type diabetes mellitus without mention of complication, not stated as uncontrolled    Varicose veins       Past Surgical History   Past Surgical History:   Procedure Laterality Date    Appendectomy      Appendectomy      Cabg      Hysterectomy      Removal gallbladder      Tonsillectomy         Family History   Family History   Problem Relation Age of Onset    Heart Disorder Father     Diabetes Father     Heart Disorder Mother     Hypertension Daughter     Lipids Daughter     Obesity Daughter     Diabetes Daughter        Social History   Social History     Socioeconomic History    Marital status:      Spouse name: Not on file    Number of children: Not on file    Years of education: Not on file    Highest education level: Not on file   Occupational History    Occupation: retired   Tobacco Use    Smoking status: Former     Current packs/day: 0.80     Average packs/day: 0.8 packs/day for 55.0 years (44.0 ttl pk-yrs)     Types: Cigarettes    Smokeless tobacco: Never    Tobacco comments:     5 per day currently   Vaping Use    Vaping status: Never Used   Substance and Sexual Activity    Alcohol use: No     Alcohol/week: 0.0 standard drinks of alcohol    Drug use: No    Sexual activity: Not Currently   Other Topics Concern     Service Not Asked    Blood Transfusions Not Asked    Caffeine Concern Not Asked    Occupational Exposure Not Asked    Hobby Hazards Not Asked    Sleep Concern Not Asked    Stress Concern Not Asked    Weight Concern Not Asked    Special Diet Not Asked    Back Care Not Asked    Exercise Yes    Bike Helmet Not Asked    Seat Belt Not Asked    Self-Exams Not Asked   Social  History Narrative    .  Lives alone.    25 grandchildren.     Social Determinants of Health     Financial Resource Strain: Not on file   Food Insecurity: Not on file   Transportation Needs: Not on file   Physical Activity: Not on file   Stress: Not on file   Social Connections: Not on file   Housing Stability: Not on file       PE:  The patient does appear in her stated age in no distress.  The patient is well groomed.    Psychiatric:  The patient is alert and oriented x 3.  The patient has a normal affect and mood.      Respiratory:  No acute respiratory distress. Patient does not have a cough.    HEENT:  Extraocular muscles are intact. There is no kern icterus. Pupils are equal, round, and reactive to light. No redness or discharge bilaterally.    Skin:  There are no rashes or lesions.    Vitals:  There were no vitals filed for this visit.    Gait:    Gait: Normal gait   Sit to Stand: no difficulty      Lumbar Spine:    Scoliosis: Thoracic levoscoliosis that is mild-moderate and Lumbar dextroscoliosis that is mild-moderate     Lumbar Spine Palpation:    Spinous Processes: Tender at L5 and S1   Z-joints: Tender at  bilateral L5-S1   SIJ: Non-tender for bilateral SIJ   Piriformis Muscle: Non-tender bilateral Piriformis muscles   Greater Trochanteric Bursa: Non-tender for bilateral Greater Trochanteric Bursa     Vascular lower extremity:   Dorsalis pedis pulse-RIGHT 2+   Dorsalis pedis pulse-LEFT 2+   Tibialis posterior pulse-RIGHT 2+   Tibialis posterior pulse-LEFT 2+     Neurological Lower Extremity:    Light Touch Sensation: Intact in bilateral Lower Extremities   LE Muscle Strength: All LE strength measurements 5/5 except:  Hamstring RIGHT:   4+/5  Hamstring LEFT:   3+/5  Extensor Hallucis Longus LEFT:   4/5   RIGHT plantar reflexes: downward response   LEFT plantar reflexes: downward response   Reflexes: 2+ in bilateral lower extremities     Assessment  1. bilateral L5-S1 radiculopathy    2. L1-2 mild-mod  diffuse & right mod foraminal, L2-3 mod diffuse, L3-4 mod-large diffuse, L4-5 mod diffuse, L5-S1 bilateral mild-mod far lateral & mild central bulging discs    3. L3-4 severe, L4-5 mod-severe spinal stenosis    4. L5-S1 left mild-mod, L4-5 left mild/right mod, L3-4 left mod-severe/right mild foraminal stenosis    5. L1-2 grade 1, L2-3 grade 1, L3-4 grade 1 Retrolisthesis    6. Chronic bilateral low back pain without sciatica    7. Castañeda's esophagus without dysplasia    8. Chronic systolic congestive heart failure (HCC)    9. Type 2 diabetes mellitus with microalbuminuria (HCC)    10. Essential hypertension         Plan  I will perform bilateral L5 TFESI(s).    She will need to have the PT once she has had the above.    She will continue with the gabapentin at night.    The patient will follow up in 3 months, but the patient will call me 2 weeks after having the injection to let me know how the injection worked.    The patient understands and agrees with the stated plan.  Derrick Srinivasan MD  5/9/2024

## 2024-05-09 NOTE — TELEPHONE ENCOUNTER
Per CMS Guidelines -no authorization is required for Bilateral L5 Transforaminal Epidural Steroid Injection CPT 86459-60     Status: Authorization is not required based on medical necessity however is not a guarantee of payment and may be subject to review once claim is submitted-Covered Benefit

## 2024-05-09 NOTE — PATIENT INSTRUCTIONS
Plan  I will perform bilateral L5 TFESI(s).    She will need to have the PT once she has had the above.    She will continue with the gabapentin at night.    The patient will follow up in 3 months, but the patient will call me 2 weeks after having the injection to let me know how the injection worked.

## 2024-05-10 NOTE — TELEPHONE ENCOUNTER
Status of Anticoag  [x] Clearance pending to hold Plavix for 7 days prior to bilateral L5 transforaminal epidural steroid injection. Faxed to Dr. Luis Armando Connor F: [phone]  [] Clearance approved  [] Clearance denied

## 2024-05-13 ENCOUNTER — APPOINTMENT (OUTPATIENT)
Dept: PHYSICAL THERAPY | Facility: HOSPITAL | Age: 84
End: 2024-05-13
Attending: PHYSICAL MEDICINE & REHABILITATION
Payer: MEDICARE

## 2024-05-15 NOTE — TELEPHONE ENCOUNTER
Status of Anticoag  [] Clearance pending  [x] Clearance approved to hold Plavix 7 days prior to procedure.  Placed into scanning.   [] Clearance denied    Requesting latest appointment available.     Patient has been scheduled for bilateral L5 transforaminal epidural steroid injection* on 5/24/24 at the [facility] with Dr. Srinivasan.   Anesthesia type:  Local  Please note: The [facility] will call the business day prior to discuss the exact time/arrival and additional instructions for your appointment.  Patient was advised that if he/she does receive the covid vaccine it needs to be at least 2 weeks before or after the injection.  Medications and allergies reviewed.  Educated to hold NSAIDS (Aleve, Ibuprofen, Motrin, Advil) and anti-inflammatories (Meloxicam, Naproxen, Diclofenac, Celebrex) and for cervical injections must hold Multi-Vitamins, Vitamin E, Fish Oil/Omega-3.  If patient is receiving MAC/IVCS, weight loss oral/injectable medications will need to be held for 7 days prior to injection.  Patient informed to fast 8 hours prior to procedure and 10-12 hours prior to procedure with IVCS/MAC if patient is on a weight loss medication.   If on blood thinner, clearance has been received and approved to hold this medication by provider.   Patient informed of [facility]'s  policy:  he/she will need a  to and from procedure and must be on site for their entirety of their visit, if their ride is unable to the procedure will be cancelled.   [facility] is located in the [facility] 1st floor [address] [place] [id number].   may park in the yellow/purple parking lot.  Patient verbalized understanding and agrees with plan.  Scheduled in Epic: Yes  Scheduled in Surgical Case: Yes  Follow up appointment made: NOV: 8/12/2024 Derrick Srinivasan MD

## 2024-05-16 ENCOUNTER — APPOINTMENT (OUTPATIENT)
Dept: PHYSICAL THERAPY | Facility: HOSPITAL | Age: 84
End: 2024-05-16
Attending: PHYSICAL MEDICINE & REHABILITATION
Payer: MEDICARE

## 2024-05-21 ENCOUNTER — APPOINTMENT (OUTPATIENT)
Dept: PHYSICAL THERAPY | Facility: HOSPITAL | Age: 84
End: 2024-05-21
Attending: PHYSICAL MEDICINE & REHABILITATION
Payer: MEDICARE

## 2024-05-23 ENCOUNTER — APPOINTMENT (OUTPATIENT)
Dept: PHYSICAL THERAPY | Facility: HOSPITAL | Age: 84
End: 2024-05-23
Attending: PHYSICAL MEDICINE & REHABILITATION
Payer: MEDICARE

## 2024-06-05 ENCOUNTER — OFFICE VISIT (OUTPATIENT)
Dept: PHYSICAL THERAPY | Age: 84
End: 2024-06-05
Attending: PHYSICAL MEDICINE & REHABILITATION
Payer: MEDICARE

## 2024-06-05 DIAGNOSIS — R80.9 TYPE 2 DIABETES MELLITUS WITH MICROALBUMINURIA (HCC): ICD-10-CM

## 2024-06-05 DIAGNOSIS — I50.22 CHRONIC SYSTOLIC CONGESTIVE HEART FAILURE (HCC): ICD-10-CM

## 2024-06-05 DIAGNOSIS — M54.50 CHRONIC BILATERAL LOW BACK PAIN WITHOUT SCIATICA: ICD-10-CM

## 2024-06-05 DIAGNOSIS — E11.29 TYPE 2 DIABETES MELLITUS WITH MICROALBUMINURIA (HCC): ICD-10-CM

## 2024-06-05 DIAGNOSIS — M54.16 LUMBAR RADICULOPATHY: Primary | ICD-10-CM

## 2024-06-05 DIAGNOSIS — M48.061 LUMBAR FORAMINAL STENOSIS: ICD-10-CM

## 2024-06-05 DIAGNOSIS — I10 ESSENTIAL HYPERTENSION: ICD-10-CM

## 2024-06-05 DIAGNOSIS — K22.70 BARRETT'S ESOPHAGUS WITHOUT DYSPLASIA: ICD-10-CM

## 2024-06-05 DIAGNOSIS — M43.10 RETROLISTHESIS: ICD-10-CM

## 2024-06-05 DIAGNOSIS — M51.9 LUMBAR DISC DISEASE: ICD-10-CM

## 2024-06-05 DIAGNOSIS — M48.061 SPINAL STENOSIS OF LUMBAR REGION WITHOUT NEUROGENIC CLAUDICATION: ICD-10-CM

## 2024-06-05 DIAGNOSIS — G89.29 CHRONIC BILATERAL LOW BACK PAIN WITHOUT SCIATICA: ICD-10-CM

## 2024-06-05 PROCEDURE — 97110 THERAPEUTIC EXERCISES: CPT

## 2024-06-05 PROCEDURE — 97162 PT EVAL MOD COMPLEX 30 MIN: CPT

## 2024-06-05 PROCEDURE — 97530 THERAPEUTIC ACTIVITIES: CPT

## 2024-06-05 NOTE — PATIENT INSTRUCTIONS
Access Code: [email or web address]  URL: [email or web address]  Date: 06/05/2024  Prepared by: Mandie Tian    Exercises  - Bent Knee Fallouts  - 2 x daily - 7 x weekly - 2 sets - 10 reps  - Supine Pelvic Tilt  - 2 x daily - 7 x weekly - 2 sets - 10 reps  - Seated Scapular Retraction  - 2 x daily - 7 x weekly - 2 sets - 10 reps  - Seated Ankle Pumps  - 2 x daily - 7 x weekly - 2 sets - 10 reps
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
## 2024-06-14 ENCOUNTER — APPOINTMENT (OUTPATIENT)
Dept: PHYSICAL THERAPY | Age: 84
End: 2024-06-14
Attending: PHYSICAL MEDICINE & REHABILITATION
Payer: MEDICARE

## 2024-06-17 ENCOUNTER — TELEPHONE (OUTPATIENT)
Dept: PHYSICAL THERAPY | Facility: HOSPITAL | Age: 84
End: 2024-06-17

## 2024-06-17 ENCOUNTER — LAB ENCOUNTER (OUTPATIENT)
Dept: LAB | Facility: HOSPITAL | Age: 84
End: 2024-06-17
Attending: NURSE PRACTITIONER

## 2024-06-17 DIAGNOSIS — R53.83 FATIGUE: ICD-10-CM

## 2024-06-17 DIAGNOSIS — M62.81 MUSCLE WEAKNESS (GENERALIZED): ICD-10-CM

## 2024-06-17 DIAGNOSIS — E11.29 TYPE II DIABETES MELLITUS WITH RENAL MANIFESTATIONS (HCC): ICD-10-CM

## 2024-06-17 DIAGNOSIS — J43.1 PANACINAR EMPHYSEMA (HCC): ICD-10-CM

## 2024-06-17 DIAGNOSIS — R80.9 PROTEINURIA: ICD-10-CM

## 2024-06-17 DIAGNOSIS — I50.22 CHRONIC SYSTOLIC HEART FAILURE (HCC): ICD-10-CM

## 2024-06-17 DIAGNOSIS — R53.81 DEBILITY, UNSPECIFIED: Primary | ICD-10-CM

## 2024-06-17 LAB
ANION GAP SERPL CALC-SCNC: 6 MMOL/L (ref 0–18)
BASOPHILS # BLD AUTO: 0.03 X10(3) UL (ref 0–0.2)
BASOPHILS NFR BLD AUTO: 0.4 %
BUN BLD-MCNC: 17 MG/DL (ref 9–23)
BUN/CREAT SERPL: 21.5 (ref 10–20)
CALCIUM BLD-MCNC: 10 MG/DL (ref 8.7–10.4)
CHLORIDE SERPL-SCNC: 102 MMOL/L (ref 98–112)
CK SERPL-CCNC: 93 U/L
CO2 SERPL-SCNC: 28 MMOL/L (ref 21–32)
CREAT BLD-MCNC: 0.79 MG/DL
CRP SERPL-MCNC: 3.5 MG/DL (ref ?–1)
DEPRECATED RDW RBC AUTO: 48.8 FL (ref 35.1–46.3)
EGFRCR SERPLBLD CKD-EPI 2021: 75 ML/MIN/1.73M2 (ref 60–?)
EOSINOPHIL # BLD AUTO: 0.15 X10(3) UL (ref 0–0.7)
EOSINOPHIL NFR BLD AUTO: 2.2 %
ERYTHROCYTE [DISTWIDTH] IN BLOOD BY AUTOMATED COUNT: 14.2 % (ref 11–15)
ERYTHROCYTE [SEDIMENTATION RATE] IN BLOOD: 56 MM/HR
FASTING STATUS PATIENT QL REPORTED: YES
GLUCOSE BLD-MCNC: 108 MG/DL (ref 70–99)
HCT VFR BLD AUTO: 37.1 %
HGB BLD-MCNC: 11.9 G/DL
IMM GRANULOCYTES # BLD AUTO: 0.03 X10(3) UL (ref 0–1)
IMM GRANULOCYTES NFR BLD: 0.4 %
LYMPHOCYTES # BLD AUTO: 0.9 X10(3) UL (ref 1–4)
LYMPHOCYTES NFR BLD AUTO: 13.1 %
MCH RBC QN AUTO: 30.1 PG (ref 26–34)
MCHC RBC AUTO-ENTMCNC: 32.1 G/DL (ref 31–37)
MCV RBC AUTO: 93.9 FL
MONOCYTES # BLD AUTO: 0.69 X10(3) UL (ref 0.1–1)
MONOCYTES NFR BLD AUTO: 10.1 %
NEUTROPHILS # BLD AUTO: 5.05 X10 (3) UL (ref 1.5–7.7)
NEUTROPHILS # BLD AUTO: 5.05 X10(3) UL (ref 1.5–7.7)
NEUTROPHILS NFR BLD AUTO: 73.8 %
OSMOLALITY SERPL CALC.SUM OF ELEC: 284 MOSM/KG (ref 275–295)
PLATELET # BLD AUTO: 202 10(3)UL (ref 150–450)
POTASSIUM SERPL-SCNC: 4.6 MMOL/L (ref 3.5–5.1)
RBC # BLD AUTO: 3.95 X10(6)UL
SODIUM SERPL-SCNC: 136 MMOL/L (ref 136–145)
WBC # BLD AUTO: 6.9 X10(3) UL (ref 4–11)

## 2024-06-17 PROCEDURE — 82550 ASSAY OF CK (CPK): CPT

## 2024-06-17 PROCEDURE — 85025 COMPLETE CBC W/AUTO DIFF WBC: CPT

## 2024-06-17 PROCEDURE — 36415 COLL VENOUS BLD VENIPUNCTURE: CPT

## 2024-06-17 PROCEDURE — 80048 BASIC METABOLIC PNL TOTAL CA: CPT

## 2024-06-17 PROCEDURE — 85652 RBC SED RATE AUTOMATED: CPT

## 2024-06-17 PROCEDURE — 86140 C-REACTIVE PROTEIN: CPT

## 2024-06-19 ENCOUNTER — APPOINTMENT (OUTPATIENT)
Dept: PHYSICAL THERAPY | Age: 84
End: 2024-06-19
Attending: PHYSICAL MEDICINE & REHABILITATION
Payer: MEDICARE

## 2024-06-20 ENCOUNTER — HOSPITAL ENCOUNTER (OUTPATIENT)
Dept: GENERAL RADIOLOGY | Facility: HOSPITAL | Age: 84
Discharge: HOME | End: 2024-06-20
Attending: NURSE PRACTITIONER

## 2024-06-20 ENCOUNTER — LAB ENCOUNTER (OUTPATIENT)
Dept: LAB | Facility: HOSPITAL | Age: 84
End: 2024-06-20
Attending: NURSE PRACTITIONER

## 2024-06-20 DIAGNOSIS — R53.1 WEAKNESS: ICD-10-CM

## 2024-06-20 DIAGNOSIS — R53.81 MALAISE AND FATIGUE: ICD-10-CM

## 2024-06-20 DIAGNOSIS — C34.91 PRIMARY LUNG CANCER WITH METASTASIS FROM LUNG TO OTHER SITE, RIGHT (HCC): ICD-10-CM

## 2024-06-20 DIAGNOSIS — R39.9 UTI SYMPTOMS: ICD-10-CM

## 2024-06-20 DIAGNOSIS — E11.29 TYPE 2 DIABETES MELLITUS WITH MICROALBUMINURIA (HCC): ICD-10-CM

## 2024-06-20 DIAGNOSIS — R53.83 MALAISE AND FATIGUE: ICD-10-CM

## 2024-06-20 DIAGNOSIS — R53.81 OTHER MALAISE: ICD-10-CM

## 2024-06-20 DIAGNOSIS — R53.83 FATIGUE: Primary | ICD-10-CM

## 2024-06-20 DIAGNOSIS — R80.9 TYPE 2 DIABETES MELLITUS WITH MICROALBUMINURIA (HCC): ICD-10-CM

## 2024-06-20 LAB
BILIRUB UR QL: NEGATIVE
CLARITY UR: CLEAR
COLOR UR: YELLOW
GLUCOSE UR-MCNC: 150 MG/DL
HGB UR QL STRIP.AUTO: NEGATIVE
KETONES UR-MCNC: NEGATIVE MG/DL
LEUKOCYTE ESTERASE UR QL STRIP.AUTO: 25
NITRITE UR QL STRIP.AUTO: NEGATIVE
PH UR: 5.5 [PH] (ref 5–8)
SP GR UR STRIP: 1.02 (ref 1–1.03)
UROBILINOGEN UR STRIP-ACNC: NORMAL

## 2024-06-20 PROCEDURE — 81001 URINALYSIS AUTO W/SCOPE: CPT

## 2024-06-20 PROCEDURE — 81015 MICROSCOPIC EXAM OF URINE: CPT

## 2024-06-20 PROCEDURE — 71046 X-RAY EXAM CHEST 2 VIEWS: CPT | Performed by: NURSE PRACTITIONER

## 2024-06-20 PROCEDURE — 87086 URINE CULTURE/COLONY COUNT: CPT

## 2024-06-21 ENCOUNTER — APPOINTMENT (OUTPATIENT)
Dept: PHYSICAL THERAPY | Age: 84
End: 2024-06-21
Attending: PHYSICAL MEDICINE & REHABILITATION
Payer: MEDICARE

## 2024-06-26 ENCOUNTER — OFFICE VISIT (OUTPATIENT)
Dept: PHYSICAL THERAPY | Age: 84
End: 2024-06-26
Attending: PHYSICAL MEDICINE & REHABILITATION
Payer: MEDICARE

## 2024-06-26 PROCEDURE — 97112 NEUROMUSCULAR REEDUCATION: CPT

## 2024-06-26 PROCEDURE — 97110 THERAPEUTIC EXERCISES: CPT

## 2024-06-26 NOTE — PROGRESS NOTES
Diagnosis:     Lumbar radiculopathy (M54.16)  Lumbar disc disease (M51.9)  Spinal stenosis of lumbar region without neurogenic claudication (M48.061)  Lumbar foraminal stenosis (M48.061)  Retrolisthesis (M43.10)  Chronic bilateral low back pain without sciatica (M54.50,G89.29)   Referring Provider: Derrick Srinivasan  Date of Evaluation:    6/5/2024    Precautions:  Cancer Next MD visit:   none scheduled  Date of Surgery: n/a   Insurance Primary/Secondary: MEDICARE / [employer] [place]PO     # Auth Visits: 10 visits cert ends 9/3/2024            Subjective: pt was dealing with extreme fatigue and went to see the NP. She is being treated for PMR per pt and she is on prednisone. The prednisone makes her feel shaky and was advised to cut the dosage by half. She reports feeling very weak. She has no c/o pain but she feels weak.    Pain: 0/10      Objective:     Lumbar AROM: 6/5/2024 (* denotes performed with pain)  Flexion: 90%  Extension: 50%* (central lumbar pain)  Sidebending: R 75% (*R lumbar pain); L 75%  Rotation: R 50% (*R lumbar pain); L 75%      Strength: (* denotes performed with pain)  LE 6/5/2024   Hip flexion (L2): R 5/5; L 4/5  Hip abduction: R 4/5; L 4/5  Hip Extension: R 3+/5; L 3+/5   Hip ER: R 4/5; L 4/5  Hip IR: R 5/5; L 5/5  Knee Flexion: R 5/5; L 5/5   Knee extension (L3): R 5/5; L 5/5   DF (L4): R 5/5; L 5/5  Great Toe Ext (L5): R NT/5, L NT/5  PF (S1): R 5/5; L 5/5         Assessment: pt with initial moderate instability with gait prior to starting session with decreased foot clearance and postural sway/wide LEEANN. She required cueing to slow down movement and to breathe through exercises. She demonstrated breath holding intermittently during tx. Post tx-, gait mechanics improved with adequate foot clearance > 75% of the time. She had required hand on wall when turning 180 deg. She had no c/o pain today.      Goals: (to be met in 10 visits)   Pt will improve transversus abdominis recruitment to perform proper  isometric contraction without requiring verbal or tactile cuing to promote advancement of therex   Pt will demonstrate good understanding of proper posture and body mechanics to decrease pain and improve spinal safety   Pt will improve lumbar spine AROM extension to >80% to allow increase ease with reaching overhead to items on shelves.  Pt will report improved symptom centralization and absence of radicular symptoms for 3 consecutive days to improve function with ADL   Pt will have decreased paraspinal mm tension to tolerate standing >20 minutes for home activities   Pt will demonstrate at least 4+/5 B hip strength to ambulate with adequate hip extension and foot clearance to walk at store without difficulty and reduced risk for falls.   Pt will be independent and compliant with comprehensive HEP to maintain progress achieved in PT     Plan: Focus on strengthening, gait and balance.  Date: 6/26/2024  TX#: 2/10 Date:                 TX#: 3/ Date:                 TX#: 4/ Date:                 TX#: 5/ Date:   Tx#: 6/   Ther ex (30 min)  Sitting: BKFO 10x2  Sitting; hip add at ball 10x2  Sitting; LAQ 10x2  Sitting: ankle pump 20x  Sitting; ankle circles 10x each dir  Sitting: alt march 10x2  Sitting: toe/heel raises 20x  Sit to/from stand 5x2       NMR (10 min)  TNE - benefit of pacing activities; walking program (provided handout for walking program guideline)  Gait training for heel to toe contact for foot clearance  Diaphragmatic breathing                     HEP: BKFO, pelvic tilt, scap retraction, ankle pumps    Charges: 2 TE, 1 NMR       Total Timed Treatment: 40 min  Total Treatment Time: 40 min

## 2024-07-05 ENCOUNTER — OFFICE VISIT (OUTPATIENT)
Dept: PHYSICAL THERAPY | Age: 84
End: 2024-07-05
Attending: PHYSICAL MEDICINE & REHABILITATION
Payer: MEDICARE

## 2024-07-05 PROCEDURE — 97112 NEUROMUSCULAR REEDUCATION: CPT

## 2024-07-05 PROCEDURE — 97110 THERAPEUTIC EXERCISES: CPT

## 2024-07-05 NOTE — PROGRESS NOTES
Diagnosis:     Lumbar radiculopathy (M54.16)  Lumbar disc disease (M51.9)  Spinal stenosis of lumbar region without neurogenic claudication (M48.061)  Lumbar foraminal stenosis (M48.061)  Retrolisthesis (M43.10)  Chronic bilateral low back pain without sciatica (M54.50,G89.29)   Referring Provider: Derrick Srinivasan  Date of Evaluation:    6/5/2024    Precautions:  Cancer Next MD visit:   none scheduled  Date of Surgery: n/a   Insurance Primary/Secondary: MEDICARE / [employer] [place]     # Auth Visits: 10 visits cert ends 9/3/2024            Subjective: She reports difficulty with sleep due to her thoughts being active. She feels very tired. Her back is painful due to sleeping on a firmer mattress. She says that that will need to decrease the air in her bed. She took tylenol this morning. She is still taking prednisone. She still feels shaky throughout her body. The shakiness comes and goes. She has been trying to work on her daily walking.  Pain: 2/10      Objective:     Lumbar AROM: 6/5/2024 (* denotes performed with pain)  Flexion: 90%  Extension: 50%* (central lumbar pain)  Sidebending: R 75% (*R lumbar pain); L 75%  Rotation: R 50% (*R lumbar pain); L 75%      Strength: (* denotes performed with pain)  LE 6/5/2024   Hip flexion (L2): R 5/5; L 4/5  Hip abduction: R 4/5; L 4/5  Hip Extension: R 3+/5; L 3+/5   Hip ER: R 4/5; L 4/5  Hip IR: R 5/5; L 5/5  Knee Flexion: R 5/5; L 5/5   Knee extension (L3): R 5/5; L 5/5   DF (L4): R 5/5; L 5/5  Great Toe Ext (L5): R NT/5, L NT/5  PF (S1): R 5/5; L 5/5         Assessment: pt reported 0/10 post-tx. Progressed to WBing exercises without provocation of pain. Cueing provided to improve safety with gait to look forward versus looking downward at the ground.       Goals: (to be met in 10 visits)   Pt will improve transversus abdominis recruitment to perform proper isometric contraction without requiring verbal or tactile cuing to promote advancement of therex   Pt will demonstrate good  understanding of proper posture and body mechanics to decrease pain and improve spinal safety   Pt will improve lumbar spine AROM extension to >80% to allow increase ease with reaching overhead to items on shelves.  Pt will report improved symptom centralization and absence of radicular symptoms for 3 consecutive days to improve function with ADL   Pt will have decreased paraspinal mm tension to tolerate standing >20 minutes for home activities   Pt will demonstrate at least 4+/5 B hip strength to ambulate with adequate hip extension and foot clearance to walk at store without difficulty and reduced risk for falls.   Pt will be independent and compliant with comprehensive HEP to maintain progress achieved in PT   7/5/2024: goals reviewed with pt. Goals are in progress.    Plan: Focus on strengthening, gait and balance.  Date: 6/26/2024  TX#: 2/10 Date: 7/5/2024               TX#: 3/10 Date:                 TX#: 4/ Date:                 TX#: 5/ Date:   Tx#: 6/   Ther ex (30 min)  Sitting: BKFO 10x2  Sitting; hip add at ball 10x2  Sitting; LAQ 10x2  Sitting: ankle pump 20x  Sitting; ankle circles 10x each dir  Sitting: alt march 10x2  Sitting: toe/heel raises 20x  Sit to/from stand 5x2 Ther ex (35 min)  Sitting: BKFO 10x2  Sitting; hip add at ball 10x2  Sitting; LAQ 10x2  Sitting: ankle pump 20x  Sitting; ankle circles 20x each dir  Sitting: alt march 10x2  Sitting: toe/heel raises 20x  Sit to/from stand 5x2  Standing: lumbar ext 10x  Standing: hip abd 10x2  Standing: hip ext 10x2  Supine; LTR 10x2  Supine; mini bridging 10x2  Supine: SKTC stretch 10x5\"      NMR (10 min)  TNE - benefit of pacing activities; walking program (provided handout for walking program guideline)  Gait training for heel to toe contact for foot clearance  Diaphragmatic breathing NMR (10 min)  Lateral stepping 10'x 5 laps  Gait training for heel to toe contact for foot clearance  Diaphragmatic breathing                      HEP: BKFO, pelvic  tilt, scap retraction, ankle pumps; bridging; LTR    Charges: 2 TE, 1 NMR       Total Timed Treatment: 45 min  Total Treatment Time: 45 min

## 2024-07-15 ENCOUNTER — OFFICE VISIT (OUTPATIENT)
Dept: PHYSICAL THERAPY | Age: 84
End: 2024-07-15
Attending: PHYSICAL MEDICINE & REHABILITATION
Payer: MEDICARE

## 2024-07-15 PROCEDURE — 97110 THERAPEUTIC EXERCISES: CPT

## 2024-07-15 PROCEDURE — 97140 MANUAL THERAPY 1/> REGIONS: CPT

## 2024-07-15 NOTE — PROGRESS NOTES
Diagnosis:     Lumbar radiculopathy (M54.16)  Lumbar disc disease (M51.9)  Spinal stenosis of lumbar region without neurogenic claudication (M48.061)  Lumbar foraminal stenosis (M48.061)  Retrolisthesis (M43.10)  Chronic bilateral low back pain without sciatica (M54.50,G89.29)   Referring Provider: Derrick Srinivasan  Date of Evaluation:    6/5/2024    Precautions:  Cancer Next MD visit:   none scheduled  Date of Surgery: n/a   Insurance Primary/Secondary: [place]IC[place] / [employer]     # Auth Visits: 10 visits cert ends 9/3/2024            Subjective: She reports that she has some pain at the back of her R hip. She has been having a hard time sleeping since taking prednisone, but not due to pain. Yesterday she walked 20 minutes at the store and she feels stronger and then 20 minutes again later in the day. She feels less shakiness like before.   Pain: 2/10      Objective:     Lumbar AROM: 6/5/2024 (* denotes performed with pain)  Flexion: 90%  Extension: 50%* (central lumbar pain)  Sidebending: R 75% (*R lumbar pain); L 75%  Rotation: R 50% (*R lumbar pain); L 75%      Strength: (* denotes performed with pain)  LE 6/5/2024   Hip flexion (L2): R 5/5; L 4/5  Hip abduction: R 4/5; L 4/5  Hip Extension: R 3+/5; L 3+/5   Hip ER: R 4/5; L 4/5  Hip IR: R 5/5; L 5/5  Knee Flexion: R 5/5; L 5/5   Knee extension (L3): R 5/5; L 5/5   DF (L4): R 5/5; L 5/5  Great Toe Ext (L5): R NT/5, L NT/5  PF (S1): R 5/5; L 5/5         Assessment: pt is reporting increased tolerance for walking, with ability to walk up to 20 minutes at a time. Pt reported 0/10 post-tx. Added thoracic ext in sitting to promote improved postural alignment for gait and standing.      Goals: (to be met in 10 visits)   Pt will improve transversus abdominis recruitment to perform proper isometric contraction without requiring verbal or tactile cuing to promote advancement of therex   Pt will demonstrate good understanding of proper posture and body mechanics to decrease  pain and improve spinal safety   Pt will improve lumbar spine AROM extension to >80% to allow increase ease with reaching overhead to items on shelves.  Pt will report improved symptom centralization and absence of radicular symptoms for 3 consecutive days to improve function with ADL   Pt will have decreased paraspinal mm tension to tolerate standing >20 minutes for home activities   Pt will demonstrate at least 4+/5 B hip strength to ambulate with adequate hip extension and foot clearance to walk at store without difficulty and reduced risk for falls.   Pt will be independent and compliant with comprehensive HEP to maintain progress achieved in PT   7/5/2024: goals reviewed with pt. Goals are in progress.    Plan: Focus on strengthening, gait and balance.  Date: 6/26/2024  TX#: 2/10 Date: 7/5/2024               TX#: 3/10 Date:  7/15/2024               TX#: 4/10 Date:                 TX#: 5/ Date:   Tx#: 6/   Ther ex (30 min)  Sitting: BKFO 10x2  Sitting; hip add at ball 10x2  Sitting; LAQ 10x2  Sitting: ankle pump 20x  Sitting; ankle circles 10x each dir  Sitting: alt march 10x2  Sitting: toe/heel raises 20x  Sit to/from stand 5x2 Ther ex (35 min)  Sitting: BKFO 10x2  Sitting; hip add at ball 10x2  Sitting; LAQ 10x2  Sitting: ankle pump 20x  Sitting; ankle circles 20x each dir  Sitting: alt march 10x2  Sitting: toe/heel raises 20x  Sit to/from stand 5x2  Standing: lumbar ext 10x  Standing: hip abd 10x2  Standing: hip ext 10x2  Supine; LTR 10x2  Supine; mini bridging 10x2  Supine: SKTC stretch 10x5\" Ther ex (30 min)  Supine; BKFO 10x2  Supine: hip add at ball 10x2  Supine; LTR 10x2  Supine; mini bridging 10x2  Supine: SKTC stretch 10x5\"  Sitting: gastroc stretch with strap 15\" x 5  Sitting: thoracic ext 10x1  Sitting; scap retraction 10x2  Standing: heel/toe raises 10x2  Standing: hip abd 10x2  Standing: hip ext 10x2       NMR (10 min)  TNE - benefit of pacing activities; walking program (provided handout for  walking program guideline)  Gait training for heel to toe contact for foot clearance  Diaphragmatic breathing NMR (10 min)  Lateral stepping 10'x 5 laps  Gait training for heel to toe contact for foot clearance  Diaphragmatic breathing          Manual therapy (15 min)  Stm to R piriformis and R glut max            HEP: BKFO, pelvic tilt, scap retraction, ankle pumps; bridging; LTR thoracic ext sitting; lumbar ext; and gastroc stretch    Charges: 2 TE, 1 NMR       Total Timed Treatment: 45 min  Total Treatment Time: 45 min

## 2024-07-15 NOTE — PATIENT INSTRUCTIONS
Access Code: [email or web address]  URL: [email or web address]  Date: 07/15/2024  Prepared by: Mandie Tian    Exercises  - Seated Gastroc Stretch with Strap  - 1-2 x daily - 7 x weekly - 1 sets - 5 reps - 20 second hold  - Seated Thoracic Lumbar Extension  - 2 x daily - 7 x weekly - 2 sets - 10 reps  - Standing Lumbar Extension  - 1-2 x daily - 7 x weekly - 1 sets - 10 reps

## 2024-07-19 ENCOUNTER — LAB ENCOUNTER (OUTPATIENT)
Dept: LAB | Facility: HOSPITAL | Age: 84
End: 2024-07-19
Attending: NURSE PRACTITIONER
Payer: MEDICARE

## 2024-07-19 DIAGNOSIS — M35.3 PMR (POLYMYALGIA RHEUMATICA) (HCC): Primary | ICD-10-CM

## 2024-07-19 LAB
CRP SERPL-MCNC: <0.4 MG/DL (ref ?–1)
ERYTHROCYTE [SEDIMENTATION RATE] IN BLOOD: 6 MM/HR

## 2024-07-19 PROCEDURE — 85652 RBC SED RATE AUTOMATED: CPT

## 2024-07-19 PROCEDURE — 86140 C-REACTIVE PROTEIN: CPT

## 2024-07-19 PROCEDURE — 36415 COLL VENOUS BLD VENIPUNCTURE: CPT

## 2024-07-31 ENCOUNTER — OFFICE VISIT (OUTPATIENT)
Dept: PHYSICAL THERAPY | Age: 84
End: 2024-07-31
Attending: PHYSICAL MEDICINE & REHABILITATION
Payer: MEDICARE

## 2024-07-31 PROCEDURE — 97110 THERAPEUTIC EXERCISES: CPT

## 2024-07-31 PROCEDURE — 97140 MANUAL THERAPY 1/> REGIONS: CPT

## 2024-07-31 NOTE — PROGRESS NOTES
Diagnosis:     Lumbar radiculopathy (M54.16)  Lumbar disc disease (M51.9)  Spinal stenosis of lumbar region without neurogenic claudication (M48.061)  Lumbar foraminal stenosis (M48.061)  Retrolisthesis (M43.10)  Chronic bilateral low back pain without sciatica (M54.50,G89.29)   Referring Provider: Derrick Srinivasan  Date of Evaluation:    6/5/2024    Precautions:  Cancer Next MD visit:   8/12/2024  Date of Surgery: n/a   Insurance Primary/Secondary: [place]IC[place] / [employer]     # Auth Visits: 10 visits cert ends 9/3/2024            Subjective: pt reports that she is feeling much better and is walking about 20 minutes each day. Her goal is to walk 30 minutes. She gets occasional cramping at the back of her R thigh. She has mild pain at the R back. She is consistent with her HEP. She is starting to wean from her prednisone.  Pain: 1-2/10      Objective:     Lumbar AROM: 7/31/2024 (* denotes performed with pain)  Flexion: 90%  Extension: 70% (no pain)  Sidebending: R 75% (no pain); L 75%  Rotation: R 60% (no pain); L 75%      Strength: (* denotes performed with pain)  LE 7/31/2024   Hip flexion (L2): R 5/5; L 4+/5  Hip abduction: R 4/5; L 4/5  Hip Extension: R 3+/5; L 3+/5   Hip ER: R 4/5; L 4/5  Hip IR: R 5/5; L 5/5  Knee Flexion: R 5/5; L 5/5   Knee extension (L3): R 5/5; L 5/5   DF (L4): R 5/5; L 5/5  Great Toe Ext (L5): R NT/5, L NT/5  PF (S1): R 5/5; L 5/5         Assessment: Lumbar ROM and hip is steadily improving with pt reporting less pain. Added sidelying hip abduction strengthening in order to improve stability with standing and gait. R lumbar paraspinals tension mildly increased compared to L.        Goals: (to be met in 10 visits)   Pt will improve transversus abdominis recruitment to perform proper isometric contraction without requiring verbal or tactile cuing to promote advancement of therex (met)  Pt will demonstrate good understanding of proper posture and body mechanics to decrease pain and improve spinal  safety (met)  Pt will improve lumbar spine AROM extension to >80% to allow increase ease with reaching overhead to items on shelves. (In progress)  Pt will report improved symptom centralization and absence of radicular symptoms for 3 consecutive days to improve function with ADL (met)  Pt will have decreased paraspinal mm tension to tolerate standing >20 minutes for home activities (in progress)  Pt will demonstrate at least 4+/5 B hip strength to ambulate with adequate hip extension and foot clearance to walk at store without difficulty and reduced risk for falls. (In progress)  Pt will be independent and compliant with comprehensive HEP to maintain progress achieved in PT (in progress)  7/31/2024: goals reviewed with pt.     Plan: Focus on strengthening, gait and balance.  Date: 6/26/2024  TX#: 2/10 Date: 7/5/2024               TX#: 3/10 Date:  7/15/2024               TX#: 4/10 Date: 7/31/2024               TX#: 5/10 Date:   Tx#: 6/   Ther ex (30 min)  Sitting: BKFO 10x2  Sitting; hip add at ball 10x2  Sitting; LAQ 10x2  Sitting: ankle pump 20x  Sitting; ankle circles 10x each dir  Sitting: alt march 10x2  Sitting: toe/heel raises 20x  Sit to/from stand 5x2 Ther ex (35 min)  Sitting: BKFO 10x2  Sitting; hip add at ball 10x2  Sitting; LAQ 10x2  Sitting: ankle pump 20x  Sitting; ankle circles 20x each dir  Sitting: alt march 10x2  Sitting: toe/heel raises 20x  Sit to/from stand 5x2  Standing: lumbar ext 10x  Standing: hip abd 10x2  Standing: hip ext 10x2  Supine; LTR 10x2  Supine; mini bridging 10x2  Supine: SKTC stretch 10x5\" Ther ex (30 min)  Supine; BKFO 10x2  Supine: hip add at ball 10x2  Supine; LTR 10x2  Supine; mini bridging 10x2  Supine: SKTC stretch 10x5\"  Sitting: gastroc stretch with strap 15\" x 5  Sitting: thoracic ext 10x1  Sitting; scap retraction 10x2  Standing: heel/toe raises 10x2  Standing: hip abd 10x2  Standing: hip ext 10x2   Ther ex (25 min)  Sidelying: hip clamshells 10x2  Sidelying: hip abd  SLR 10x2  Sitting: gastroc stretch with strap 15\" x 5  Sitting: thoracic ext 10x1  Sitting; scap retraction 10x2  Lateral stepping 2 x20'  SL stance 10x5\"  Standing lumbar ext 10x  Gait reviewed - pt independent with heel to toe pattern for foot clearance      NMR (10 min)  TNE - benefit of pacing activities; walking program (provided handout for walking program guideline)  Gait training for heel to toe contact for foot clearance  Diaphragmatic breathing NMR (10 min)  Lateral stepping 10'x 5 laps  Gait training for heel to toe contact for foot clearance  Diaphragmatic breathing          Manual therapy (15 min)  Stm to R piriformis and R glut max Manual therapy (15 min)  Stm to R piriformis, hamstrings and R glut max, R lumbar paraspinals           HEP: BKFO, pelvic tilt, scap retraction, ankle pumps; bridging; LTR; thoracic ext sitting; lumbar ext; and gastroc stretch    Charges: 2 TE, 1 MT    Total Timed Treatment: 40 min  Total Treatment Time: 40 min

## 2024-08-07 ENCOUNTER — OFFICE VISIT (OUTPATIENT)
Dept: PHYSICAL THERAPY | Age: 84
End: 2024-08-07
Attending: PHYSICAL MEDICINE & REHABILITATION
Payer: MEDICARE

## 2024-08-07 PROCEDURE — 97110 THERAPEUTIC EXERCISES: CPT

## 2024-08-07 PROCEDURE — 97140 MANUAL THERAPY 1/> REGIONS: CPT

## 2024-08-07 NOTE — PROGRESS NOTES
Diagnosis:     Lumbar radiculopathy (M54.16)  Lumbar disc disease (M51.9)  Spinal stenosis of lumbar region without neurogenic claudication (M48.061)  Lumbar foraminal stenosis (M48.061)  Retrolisthesis (M43.10)  Chronic bilateral low back pain without sciatica (M54.50,G89.29)   Referring Provider: Derrick Srinivasan  Date of Evaluation:    6/5/2024    Precautions:  Cancer Next MD visit:   8/12/2024  Date of Surgery: n/a   Insurance Primary/Secondary: MEDICARE / [employer] [place]     # Auth Visits: 14 visits cert ends 11/5/2024             Progress Summary  Pt has attended 6 visits in Physical Therapy.     Subjective: pt reports that she feels 70% better. pt has increased her walking to 30 consecutive minutes which is going well. Her low back is sore and thinks it's from the change in the weather. She is still weaning off the prednisone. Her goal for PT is to return to \"100% as PLF\".  Pain: Current = 5/10; Best = 0/10; Worst = 5/10      Objective:     Lumbar AROM: 8/7/2024 (* denotes performed with pain)  Flexion: 90%  Extension: 70% (* pain at lower lumbar)  Sidebending: R 75% L 75%  Rotation: R 60%; L 75%      Strength: (* denotes performed with pain)  LE 7/31/2024   Hip flexion (L2): R 5/5; L 4+/5  Hip abduction: R 4/5; L 4/5  Hip Extension: R 3+/5; L 3+/5   Hip ER: R 4/5; L 4/5  Hip IR: R 5/5; L 5/5  Knee Flexion: R 5/5; L 5/5   Knee extension (L3): R 5/5; L 5/5   DF (L4): R 5/5; L 5/5  Great Toe Ext (L5): R NT/5, L NT/5  PF (S1): R 5/5; L 5/5         Assessment: Patient has attended 6 PT visits and has responded fairly well to address her primary c/o lumbar pain. There were gaps in her care due to recent diagnosis of polymyalgia rheumatica. She is currently on prednisone treatment for this. Patient reports that she feels 70% better and she is able to walk and exercise more. Patient education has been provided to pace her activities for energy conservation and to avoid overactivity as she reports feeling very fatigued when  she \"does too much\". Her back pain ranges from 0-5/10. Lumbar ROM adn LE strength has improved, but deficit still remain. Her L LE is weaker than R. Patient will continue to benefit from PT to improve ROM, strength and functional mobility.       Goals: (to be met in 14 visits)   Pt will improve transversus abdominis recruitment to perform proper isometric contraction without requiring verbal or tactile cuing to promote advancement of therex (met)  Pt will demonstrate good understanding of proper posture and body mechanics to decrease pain and improve spinal safety (met)  Pt will improve lumbar spine AROM extension to >80% to allow increase ease with reaching overhead to items on shelves. (In progress)  Pt will report improved symptom centralization and absence of radicular symptoms for 3 consecutive days to improve function with ADL (met)  Pt will have decreased paraspinal mm tension to tolerate standing >20 minutes for home activities (in progress)  Pt will demonstrate at least 4+/5 B hip strength to ambulate with adequate hip extension and foot clearance to walk at store without difficulty and reduced risk for falls. (In progress)  Pt will be independent and compliant with comprehensive HEP to maintain progress achieved in PT (in progress)  7/31/2024: goals reviewed with pt.     Post Oswestry Disability Index Score  Post Score: 30 % (8/7/2024  2:56 PM)    -8 % improvement    Plan: Continue skilled Physical Therapy 1 x/week or a total of 8 additional visits over a 90 day period. Treatment will include: Patient education; home exercise program; therapeutic exercise; therapeutic activity; manual therapy; neuromuscular re-education; gait and balance training; modalities, prn         Patient/Family/Caregiver was advised of these findings, precautions, and treatment options and has agreed to actively participate in planning and for this course of care.    Thank you for your referral. If you have any questions, please  contact me at Dept: [phone].    Sincerely,  Electronically signed by therapist: Mandie Tian PT     Physician's certification required:  Yes  Please co-sign or sign and return this letter via fax as soon as possible to [phone].   I certify the need for these services furnished under this plan of treatment and while under my care.    X___________________________________________________ Date____________________    Certification From: 8/7/2024  To:11/5/2024     Date: 6/26/2024  TX#: 2/10 Date: 7/5/2024               TX#: 3/10 Date:  7/15/2024               TX#: 4/10 Date: 7/31/2024               TX#: 5/10 Date: 8/7/2024  Tx#: 6/10   Ther ex (30 min)  Sitting: BKFO 10x2  Sitting; hip add at ball 10x2  Sitting; LAQ 10x2  Sitting: ankle pump 20x  Sitting; ankle circles 10x each dir  Sitting: alt march 10x2  Sitting: toe/heel raises 20x  Sit to/from stand 5x2 Ther ex (35 min)  Sitting: BKFO 10x2  Sitting; hip add at ball 10x2  Sitting; LAQ 10x2  Sitting: ankle pump 20x  Sitting; ankle circles 20x each dir  Sitting: alt march 10x2  Sitting: toe/heel raises 20x  Sit to/from stand 5x2  Standing: lumbar ext 10x  Standing: hip abd 10x2  Standing: hip ext 10x2  Supine; LTR 10x2  Supine; mini bridging 10x2  Supine: SKTC stretch 10x5\" Ther ex (30 min)  Supine; BKFO 10x2  Supine: hip add at ball 10x2  Supine; LTR 10x2  Supine; mini bridging 10x2  Supine: SKTC stretch 10x5\"  Sitting: gastroc stretch with strap 15\" x 5  Sitting: thoracic ext 10x1  Sitting; scap retraction 10x2  Standing: heel/toe raises 10x2  Standing: hip abd 10x2  Standing: hip ext 10x2   Ther ex (25 min)  Sidelying: hip clamshells 10x2  Sidelying: hip abd SLR 10x2  Sitting: gastroc stretch with strap 15\" x 5  Sitting: thoracic ext 10x1  Sitting; scap retraction 10x2  Lateral stepping 2 x20'  SL stance 10x5\"  Standing lumbar ext 10x  Gait reviewed - pt independent with heel to toe pattern for foot clearance   Ther Ex (30 min)  Supine: BKFO  10x2  Supine: LTR 10x2  Supine: hip add at ball 20x5\"  Supine: mini bridging 10x2  Sidelying: hip clamshells 10x2  Standing: lumbar ext 10x  Sitting: gastroc stretch with strap 15\" x 5  Sitting: thoracic ext 10x2  Sitting; scap retraction 10x2  Standing: bicep curls 2#: 10x  Sitting: shoulder ER 2#: 10x   NMR (10 min)  TNE - benefit of pacing activities; walking program (provided handout for walking program guideline)  Gait training for heel to toe contact for foot clearance  Diaphragmatic breathing NMR (10 min)  Lateral stepping 10'x 5 laps  Gait training for heel to toe contact for foot clearance  Diaphragmatic breathing          Manual therapy (15 min)  Stm to R piriformis and R glut max Manual therapy (15 min)  Stm to R piriformis, hamstrings and R glut max, R lumbar paraspinals Manual therapy (15 min)  Stm to R piriformis, hamstrings and R glut max, R lumbar paraspinals          HEP: BKFO, pelvic tilt, scap retraction, ankle pumps; bridging; LTR; thoracic ext sitting; lumbar ext; and gastroc stretch    Charges: 2 TE, 1 MT    Total Timed Treatment: 45 min  Total Treatment Time: 45 min

## 2024-08-12 ENCOUNTER — OFFICE VISIT (OUTPATIENT)
Dept: PHYSICAL MEDICINE AND REHAB | Facility: CLINIC | Age: 84
End: 2024-08-12
Payer: MEDICARE

## 2024-08-12 VITALS — WEIGHT: 135 LBS | BODY MASS INDEX: 26.5 KG/M2 | HEIGHT: 60 IN

## 2024-08-12 DIAGNOSIS — M54.50 CHRONIC BILATERAL LOW BACK PAIN WITHOUT SCIATICA: ICD-10-CM

## 2024-08-12 DIAGNOSIS — G89.29 CHRONIC BILATERAL LOW BACK PAIN WITHOUT SCIATICA: ICD-10-CM

## 2024-08-12 DIAGNOSIS — M48.061 SPINAL STENOSIS OF LUMBAR REGION WITHOUT NEUROGENIC CLAUDICATION: ICD-10-CM

## 2024-08-12 DIAGNOSIS — M51.9 LUMBAR DISC DISEASE: ICD-10-CM

## 2024-08-12 DIAGNOSIS — M43.10 RETROLISTHESIS: ICD-10-CM

## 2024-08-12 DIAGNOSIS — M35.3 PMR (POLYMYALGIA RHEUMATICA) (HCC): ICD-10-CM

## 2024-08-12 DIAGNOSIS — M54.16 LUMBAR RADICULOPATHY: Primary | ICD-10-CM

## 2024-08-12 PROCEDURE — 99213 OFFICE O/P EST LOW 20 MIN: CPT | Performed by: PHYSICAL MEDICINE & REHABILITATION

## 2024-08-12 NOTE — PROGRESS NOTES
Low Back Pain [facility] & P    Chief Complaint:   Chief Complaint   Patient presents with    Follow - Up     LOV 5/9/24 pt is here for a follow up. No N/T. Currently taking prednisone  and gabapentin. Currently in physical therapy. Pain 3/10     Nursing note reviewed and verified.    Patient was last seen on 5/9/2024.  On 5/24/2024, I did the bilateral L5 TFESI's which helped her significantly. She has been doing well until about 10 days ago when she developed right low back pain and right hip pain.  This pain is intermittent and she is able to tolerate it.  She has been doing the PT with [facility] which has also been helping her.  The pain started to return with the humid weather.    She was diagnosed with PMR due diffuse weakness and mid back pain.  She was prescribed prednisone 10 mg a day.  She is now on 9 mg a day.        Past Medical History   Past Medical History:    Atherosclerosis of coronary artery    Castañeda's esophagus without dysplasia    CAD (coronary artery disease)    s/p stent placement 2004;77 year old female with PMHx CAD s/p PCI in 2004/2005, s/p CABG 2V (LIMA-RI, GEORGINA-RCA) 8/2017, s/p GERARDO to LM 3/22/2018, recent LHC on 3/29/2018 s/p POBA to pRCA who presents for staged PCI of RCA.    Congestive heart disease (HCC)    Coronary atherosclerosis    Essential hypertension    GERD (gastroesophageal reflux disease)    H/O hammer toe correction    Hearing impairment    High blood pressure    High cholesterol    HTN (hypertension)    Hyperlipidemia    Osteoarthritis    S/P appendectomy    S/p bilateral blepharoplasty    S/P CABG x 2    S/P cholecystectomy    S/P tonsillectomy and adenoidectomy    SNHL (sensorineural hearing loss)    bilateral hearing aids    Tobacco use    Type II or unspecified type diabetes mellitus without mention of complication, not stated as uncontrolled    Varicose veins       Past Surgical History   Past Surgical History:   Procedure Laterality Date    Appendectomy      Appendectomy       Cabg      Hysterectomy      Removal gallbladder      Tonsillectomy         Family History   Family History   Problem Relation Age of Onset    Heart Disorder Father     Diabetes Father     Heart Disorder Mother     Hypertension Daughter     Lipids Daughter     Obesity Daughter     Diabetes Daughter        Social History   Social History     Socioeconomic History    Marital status:      Spouse name: Not on file    Number of children: Not on file    Years of education: Not on file    Highest education level: Not on file   Occupational History    Occupation: retired   Tobacco Use    Smoking status: Former     Current packs/day: 0.80     Average packs/day: 0.8 packs/day for 55.0 years (44.0 ttl pk-yrs)     Types: Cigarettes    Smokeless tobacco: Never    Tobacco comments:     5 per day currently   Vaping Use    Vaping status: Never Used   Substance and Sexual Activity    Alcohol use: No     Alcohol/week: 0.0 standard drinks of alcohol    Drug use: No    Sexual activity: Not Currently   Other Topics Concern     Service Not Asked    Blood Transfusions Not Asked    Caffeine Concern Not Asked    Occupational Exposure Not Asked    Hobby Hazards Not Asked    Sleep Concern Not Asked    Stress Concern Not Asked    Weight Concern Not Asked    Special Diet Not Asked    Back Care Not Asked    Exercise Yes    Bike Helmet Not Asked    Seat Belt Not Asked    Self-Exams Not Asked   Social History Narrative    .  Lives alone.    25 grandchildren.     Social Determinants of Health     Financial Resource Strain: Not on file   Food Insecurity: Not on file   Transportation Needs: Not on file   Physical Activity: Not on file   Stress: Not on file   Social Connections: Not on file   Housing Stability: Not on file       PE:  The patient does appear in her stated age in no distress.  The patient is well groomed.    Psychiatric:  The patient is alert and oriented x 3.  The patient has a normal affect and mood.       Respiratory:  No acute respiratory distress. Patient does not have a cough.    HEENT:  Extraocular muscles are intact. There is no kern icterus. Pupils are equal, round, and reactive to light. No redness or discharge bilaterally.    Skin:  There are no rashes or lesions.    Vitals:  There were no vitals filed for this visit.    Gait:    Gait: Normal gait   Sit to Stand: no difficulty      Lumbar Spine:    Scoliosis: Thoracic dextroscoliosis that is mild and Lumbar levoscoliosis that is mild with moderate thoracic kyphosis     Lumbar Spine Palpation:    Spinous Processes: Non-tender for all Spinous Processes   Z-joints: Non-tender for all Z-joints   SIJ: Tender at right SIJ   Piriformis Muscle: Non-tender bilateral Piriformis muscles   Greater Trochanteric Bursa: Non-tender for bilateral Greater Trochanteric Bursa     Vascular lower extremity:   Dorsalis pedis pulse-RIGHT 2+   Dorsalis pedis pulse-LEFT 2+   Tibialis posterior pulse-RIGHT 2+   Tibialis posterior pulse-LEFT 2+     Neurological Lower Extremity:    Light Touch Sensation: Intact in bilateral LE except:  Increased in the first dorsal web space of the RIGHT foot  first dorsal web space of the LEFT foot   LE Muscle Strength: All LE strength measurements 5/5   RIGHT plantar reflexes: downward response   LEFT plantar reflexes: downward response   Reflexes: 2+ in bilateral lower extremities     Assessment  1. bilateral L5-S1 radiculopathy    2. L1-2 mild-mod diffuse & right mod foraminal, L2-3 mod diffuse, L3-4 mod-large diffuse, L4-5 mod diffuse, L5-S1 bilateral mild-mod far lateral & mild central bulging discs    3. L3-4 severe, L4-5 mod-severe spinal stenosis    4. L1-2 grade 1, L2-3 grade 1, L3-4 grade 1 Retrolisthesis    5. Chronic bilateral low back pain without sciatica    6. PMR (polymyalgia rheumatica) (HCC)         Plan  The patient will continue with PT.    The patient will continue with her home exercise program.    She will finish off the  treatment for the PMR.    She will follow up in 3 months or sooner if needed.    The patient understands and agrees with the stated plan.  Derrick Srinivasan MD  8/12/2024

## 2024-08-14 ENCOUNTER — OFFICE VISIT (OUTPATIENT)
Dept: PHYSICAL THERAPY | Age: 84
End: 2024-08-14
Attending: PHYSICAL MEDICINE & REHABILITATION
Payer: MEDICARE

## 2024-08-14 PROCEDURE — 97140 MANUAL THERAPY 1/> REGIONS: CPT

## 2024-08-14 PROCEDURE — 97110 THERAPEUTIC EXERCISES: CPT

## 2024-08-14 NOTE — PROGRESS NOTES
Diagnosis:     Lumbar radiculopathy (M54.16)  Lumbar disc disease (M51.9)  Spinal stenosis of lumbar region without neurogenic claudication (M48.061)  Lumbar foraminal stenosis (M48.061)  Retrolisthesis (M43.10)  Chronic bilateral low back pain without sciatica (M54.50,G89.29)   Referring Provider: Derrick Srinivasan  Date of Evaluation:    6/5/2024    Precautions:  Cancer Next MD visit:   8/12/2024  Date of Surgery: n/a   Insurance Primary/Secondary: MEDICARE / [employer] [place] PPO     # Auth Visits: 14 visits cert ends 11/5/2024            Subjective: pt reports that her R lower back is painful today, and she's not sure why. She has been doing her HEP.  Pain: Current = 4/10; Best = 0/10; Worst = 5/10    Objective:     Lumbar AROM: 8/7/2024 (* denotes performed with pain)  Flexion: 90%  Extension: 70% (* pain at lower lumbar)  Sidebending: R 75% L 75%  Rotation: R 60%; L 75%      Strength: (* denotes performed with pain)  LE 7/31/2024   Hip flexion (L2): R 5/5; L 4+/5  Hip abduction: R 4/5; L 4/5  Hip Extension: R 3+/5; L 3+/5   Hip ER: R 4/5; L 4/5  Hip IR: R 5/5; L 5/5  Knee Flexion: R 5/5; L 5/5   Knee extension (L3): R 5/5; L 5/5   DF (L4): R 5/5; L 5/5  Great Toe Ext (L5): R NT/5, L NT/5  PF (S1): R 5/5; L 5/5         Assessment: progressed lateral hip strengthening today to improve standing and gait endurance and mechanics. Pt requested to decrease weights from 2# to 1# due to shoulder pain.     Goals: (to be met in 14 visits)   Pt will improve transversus abdominis recruitment to perform proper isometric contraction without requiring verbal or tactile cuing to promote advancement of therex (met)  Pt will demonstrate good understanding of proper posture and body mechanics to decrease pain and improve spinal safety (met)  Pt will improve lumbar spine AROM extension to >80% to allow increase ease with reaching overhead to items on shelves. (In progress)  Pt will report improved symptom centralization and absence of radicular  symptoms for 3 consecutive days to improve function with ADL (met)  Pt will have decreased paraspinal mm tension to tolerate standing >20 minutes for home activities (in progress)  Pt will demonstrate at least 4+/5 B hip strength to ambulate with adequate hip extension and foot clearance to walk at store without difficulty and reduced risk for falls. (In progress)  Pt will be independent and compliant with comprehensive HEP to maintain progress achieved in PT (in progress)  7/31/2024: goals reviewed with pt.     Post Oswestry Disability Index Score  Post Score: 30 % (8/7/2024  2:56 PM)    -8 % improvement    Plan: Focus on progressive strengthening.     Date: 6/26/2024  TX#: 2/10 Date: 7/5/2024               TX#: 3/10 Date:  7/15/2024               TX#: 4/10 Date: 7/31/2024               TX#: 5/10 Date: 8/7/2024  Tx#: 6/10 Date: 8/14/2024  Tx#: 7/14   Ther ex (30 min)  Sitting: BKFO 10x2  Sitting; hip add at ball 10x2  Sitting; LAQ 10x2  Sitting: ankle pump 20x  Sitting; ankle circles 10x each dir  Sitting: alt march 10x2  Sitting: toe/heel raises 20x  Sit to/from stand 5x2 Ther ex (35 min)  Sitting: BKFO 10x2  Sitting; hip add at ball 10x2  Sitting; LAQ 10x2  Sitting: ankle pump 20x  Sitting; ankle circles 20x each dir  Sitting: alt march 10x2  Sitting: toe/heel raises 20x  Sit to/from stand 5x2  Standing: lumbar ext 10x  Standing: hip abd 10x2  Standing: hip ext 10x2  Supine; LTR 10x2  Supine; mini bridging 10x2  Supine: SKTC stretch 10x5\" Ther ex (30 min)  Supine; BKFO 10x2  Supine: hip add at ball 10x2  Supine; LTR 10x2  Supine; mini bridging 10x2  Supine: SKTC stretch 10x5\"  Sitting: gastroc stretch with strap 15\" x 5  Sitting: thoracic ext 10x1  Sitting; scap retraction 10x2  Standing: heel/toe raises 10x2  Standing: hip abd 10x2  Standing: hip ext 10x2   Ther ex (25 min)  Sidelying: hip clamshells 10x2  Sidelying: hip abd SLR 10x2  Sitting: gastroc stretch with strap 15\" x 5  Sitting: thoracic ext  10x1  Sitting; scap retraction 10x2  Lateral stepping 2 x20'  SL stance 10x5\"  Standing lumbar ext 10x  Gait reviewed - pt independent with heel to toe pattern for foot clearance   Ther Ex (30 min)  Supine: BKFO 10x2  Supine: LTR 10x2  Supine: hip add at ball 20x5\"  Supine: mini bridging 10x2  Sidelying: hip clamshells 10x2  Standing: lumbar ext 10x  Sitting: gastroc stretch with strap 15\" x 5  Sitting: thoracic ext 10x2  Sitting; scap retraction 10x2  Standing: bicep curls 2#: 10x  Sitting: shoulder ER 2#: 10x Ther Ex (30 min)  Supine: BKFO with red tband 10x2  Supine: LTR 10x2  Supine: alt march red tband 10x2  Supine: hip flex SLR 10x  Supine: mini bridging with hip add at ball 10x2  Sidelying: hip clamshells 10x2  Sidelying: hip abd SLR 10x2  Standing: lumbar ext 10x  Sitting: gastroc stretch with strap 15\" x 5  Sitting: thoracic ext 10x2  Standing: bicep curls 1#: 10x  Sitting: shoulder ER 1#: 10x  Step up fwd 6\" 10x  Side stepping along barre x 6 laps   NMR (10 min)  TNE - benefit of pacing activities; walking program (provided handout for walking program guideline)  Gait training for heel to toe contact for foot clearance  Diaphragmatic breathing NMR (10 min)  Lateral stepping 10'x 5 laps  Gait training for heel to toe contact for foot clearance  Diaphragmatic breathing           Manual therapy (15 min)  Stm to R piriformis and R glut max Manual therapy (15 min)  Stm to R piriformis, hamstrings and R glut max, R lumbar paraspinals Manual therapy (15 min)  Stm to R piriformis, hamstrings and R glut max, R lumbar paraspinals Manual therapy (15 min)  Stm to R piriformis, R glut max, R lumbar paraspinals           HEP: BKFO, pelvic tilt, scap retraction, ankle pumps; bridging; LTR; thoracic ext sitting; lumbar ext; and gastroc stretch    Charges: 2 TE, 1 MT    Total Timed Treatment: 45 min  Total Treatment Time: 45 min

## 2024-08-21 ENCOUNTER — LAB ENCOUNTER (OUTPATIENT)
Dept: LAB | Facility: HOSPITAL | Age: 84
End: 2024-08-21
Attending: NURSE PRACTITIONER
Payer: MEDICARE

## 2024-08-21 DIAGNOSIS — R53.81 OTHER MALAISE: Primary | ICD-10-CM

## 2024-08-21 DIAGNOSIS — I50.22 CHRONIC SYSTOLIC CONGESTIVE HEART FAILURE (HCC): ICD-10-CM

## 2024-08-21 DIAGNOSIS — R53.83 FATIGUE: ICD-10-CM

## 2024-08-21 DIAGNOSIS — R39.9 UTI SYMPTOMS: ICD-10-CM

## 2024-08-21 DIAGNOSIS — E11.29 TYPE II DIABETES MELLITUS WITH RENAL MANIFESTATIONS (HCC): ICD-10-CM

## 2024-08-21 DIAGNOSIS — J43.1 PANACINAR EMPHYSEMA (HCC): ICD-10-CM

## 2024-08-21 DIAGNOSIS — M62.81 MUSCLE WEAKNESS: ICD-10-CM

## 2024-08-21 LAB
ANION GAP SERPL CALC-SCNC: 8 MMOL/L (ref 0–18)
BASOPHILS # BLD AUTO: 0.03 X10(3) UL (ref 0–0.2)
BASOPHILS NFR BLD AUTO: 0.3 %
BILIRUB UR QL: NEGATIVE
BUN BLD-MCNC: 25 MG/DL (ref 9–23)
BUN/CREAT SERPL: 26.9 (ref 10–20)
CALCIUM BLD-MCNC: 9.2 MG/DL (ref 8.7–10.4)
CHLORIDE SERPL-SCNC: 110 MMOL/L (ref 98–112)
CK SERPL-CCNC: 82 U/L
CO2 SERPL-SCNC: 25 MMOL/L (ref 21–32)
COLOR UR: YELLOW
CREAT BLD-MCNC: 0.93 MG/DL
CRP SERPL-MCNC: <0.4 MG/DL (ref ?–1)
DEPRECATED RDW RBC AUTO: 60.2 FL (ref 35.1–46.3)
EGFRCR SERPLBLD CKD-EPI 2021: 61 ML/MIN/1.73M2 (ref 60–?)
EOSINOPHIL # BLD AUTO: 0.02 X10(3) UL (ref 0–0.7)
EOSINOPHIL NFR BLD AUTO: 0.2 %
ERYTHROCYTE [DISTWIDTH] IN BLOOD BY AUTOMATED COUNT: 16.6 % (ref 11–15)
ERYTHROCYTE [SEDIMENTATION RATE] IN BLOOD: 2 MM/HR
FASTING STATUS PATIENT QL REPORTED: NO
GLUCOSE BLD-MCNC: 201 MG/DL (ref 70–99)
GLUCOSE UR-MCNC: >1000 MG/DL
HCT VFR BLD AUTO: 37.6 %
HGB BLD-MCNC: 11.9 G/DL
HGB UR QL STRIP.AUTO: NEGATIVE
IMM GRANULOCYTES # BLD AUTO: 0.1 X10(3) UL (ref 0–1)
IMM GRANULOCYTES NFR BLD: 1 %
LEUKOCYTE ESTERASE UR QL STRIP.AUTO: NEGATIVE
LYMPHOCYTES # BLD AUTO: 1.01 X10(3) UL (ref 1–4)
LYMPHOCYTES NFR BLD AUTO: 9.6 %
MCH RBC QN AUTO: 30.9 PG (ref 26–34)
MCHC RBC AUTO-ENTMCNC: 31.6 G/DL (ref 31–37)
MCV RBC AUTO: 97.7 FL
MONOCYTES # BLD AUTO: 0.3 X10(3) UL (ref 0.1–1)
MONOCYTES NFR BLD AUTO: 2.9 %
NEUTROPHILS # BLD AUTO: 9.01 X10 (3) UL (ref 1.5–7.7)
NEUTROPHILS # BLD AUTO: 9.01 X10(3) UL (ref 1.5–7.7)
NEUTROPHILS NFR BLD AUTO: 86 %
NITRITE UR QL STRIP.AUTO: NEGATIVE
OSMOLALITY SERPL CALC.SUM OF ELEC: 306 MOSM/KG (ref 275–295)
PH UR: 5.5 [PH] (ref 5–8)
PLATELET # BLD AUTO: 245 10(3)UL (ref 150–450)
POTASSIUM SERPL-SCNC: 5 MMOL/L (ref 3.5–5.1)
RBC # BLD AUTO: 3.85 X10(6)UL
SODIUM SERPL-SCNC: 143 MMOL/L (ref 136–145)
SP GR UR STRIP: 1.03 (ref 1–1.03)
UROBILINOGEN UR STRIP-ACNC: NORMAL
WBC # BLD AUTO: 10.5 X10(3) UL (ref 4–11)

## 2024-08-21 PROCEDURE — 86140 C-REACTIVE PROTEIN: CPT

## 2024-08-21 PROCEDURE — 82550 ASSAY OF CK (CPK): CPT

## 2024-08-21 PROCEDURE — 80048 BASIC METABOLIC PNL TOTAL CA: CPT

## 2024-08-21 PROCEDURE — 36415 COLL VENOUS BLD VENIPUNCTURE: CPT

## 2024-08-21 PROCEDURE — 85652 RBC SED RATE AUTOMATED: CPT

## 2024-08-21 PROCEDURE — 85025 COMPLETE CBC W/AUTO DIFF WBC: CPT

## 2024-08-21 PROCEDURE — 81015 MICROSCOPIC EXAM OF URINE: CPT

## 2024-08-21 PROCEDURE — 81001 URINALYSIS AUTO W/SCOPE: CPT

## 2024-08-23 ENCOUNTER — OFFICE VISIT (OUTPATIENT)
Dept: PHYSICAL THERAPY | Age: 84
End: 2024-08-23
Attending: PHYSICAL MEDICINE & REHABILITATION
Payer: MEDICARE

## 2024-08-23 PROCEDURE — 97140 MANUAL THERAPY 1/> REGIONS: CPT

## 2024-08-23 PROCEDURE — 97110 THERAPEUTIC EXERCISES: CPT

## 2024-08-23 NOTE — PROGRESS NOTES
Diagnosis:     Lumbar radiculopathy (M54.16)  Lumbar disc disease (M51.9)  Spinal stenosis of lumbar region without neurogenic claudication (M48.061)  Lumbar foraminal stenosis (M48.061)  Retrolisthesis (M43.10)  Chronic bilateral low back pain without sciatica (M54.50,G89.29)   Referring Provider: Derrick Srinivasan  Date of Evaluation:    6/5/2024    Precautions:  Cancer Next MD visit:   8/12/2024  Date of Surgery: n/a   Insurance Primary/Secondary: MEDICARE / [employer] [place] PPO     # Auth Visits: 14 visits cert ends 11/5/2024            Subjective: pt reports that the weather change seems to have an affect on her back symptoms. She has been doing her exercises which continue to help.  Pain: Current = 3/10; Best = 0/10; Worst = 5/10    Objective:     Lumbar AROM: 8/23/2024 (* denotes performed with pain)  Flexion: 90%  Extension: 80% (no pain)  Sidebending: R 75% L 75%  Rotation: R 75%; L 75%      Strength: (* denotes performed with pain)  LE 8/23/2024   Hip flexion (L2): R 5/5; L 4+/5  Hip abduction: R 4/5; L 4/5  Hip Extension: R 4/5; L 4/5   Hip ER: R 4+/5; L 4/5  Hip IR: R 5/5; L 5/5  Knee Flexion: R 5/5; L 5/5   Knee extension (L3): R 5/5; L 5/5   DF (L4): R 5/5; L 5/5  Great Toe Ext (L5): R NT/5, L NT/5  PF (S1): R 5/5; L 5/5         Assessment: lumbar ROM and LE strength has improved. Piriformis stretch was added today to improve hip mobility and pt reported no reduced pain at her back. Following tx, pt reported 0/10 pain.       Goals: (to be met in 14 visits)   Pt will improve transversus abdominis recruitment to perform proper isometric contraction without requiring verbal or tactile cuing to promote advancement of therex (met)  Pt will demonstrate good understanding of proper posture and body mechanics to decrease pain and improve spinal safety (met)  Pt will improve lumbar spine AROM extension to >80% to allow increase ease with reaching overhead to items on shelves. (In progress)  Pt will report improved symptom  centralization and absence of radicular symptoms for 3 consecutive days to improve function with ADL (met)  Pt will have decreased paraspinal mm tension to tolerate standing >20 minutes for home activities (in progress)  Pt will demonstrate at least 4+/5 B hip strength to ambulate with adequate hip extension and foot clearance to walk at store without difficulty and reduced risk for falls. (In progress)  Pt will be independent and compliant with comprehensive HEP to maintain progress achieved in PT (in progress)  7/31/2024: goals reviewed with pt.     Post Oswestry Disability Index Score  Post Score: 30 % (8/7/2024  2:56 PM)    -8 % improvement    Plan: Focus on progressive strengthening.     Date:  7/15/2024               TX#: 4/10 Date: 7/31/2024               TX#: 5/10 Date: 8/7/2024  Tx#: 6/10 Date: 8/14/2024  Tx#: 7/14 Date: 8/23/2024  Tx#: 8/14   Ther ex (30 min)  Supine; BKFO 10x2  Supine: hip add at ball 10x2  Supine; LTR 10x2  Supine; mini bridging 10x2  Supine: SKTC stretch 10x5\"  Sitting: gastroc stretch with strap 15\" x 5  Sitting: thoracic ext 10x1  Sitting; scap retraction 10x2  Standing: heel/toe raises 10x2  Standing: hip abd 10x2  Standing: hip ext 10x2   Ther ex (25 min)  Sidelying: hip clamshells 10x2  Sidelying: hip abd SLR 10x2  Sitting: gastroc stretch with strap 15\" x 5  Sitting: thoracic ext 10x1  Sitting; scap retraction 10x2  Lateral stepping 2 x20'  SL stance 10x5\"  Standing lumbar ext 10x  Gait reviewed - pt independent with heel to toe pattern for foot clearance   Ther Ex (30 min)  Supine: BKFO 10x2  Supine: LTR 10x2  Supine: hip add at ball 20x5\"  Supine: mini bridging 10x2  Sidelying: hip clamshells 10x2  Standing: lumbar ext 10x  Sitting: gastroc stretch with strap 15\" x 5  Sitting: thoracic ext 10x2  Sitting; scap retraction 10x2  Standing: bicep curls 2#: 10x  Sitting: shoulder ER 2#: 10x Ther Ex (30 min)  Supine: BKFO with red tband 10x2  Supine: LTR 10x2  Supine: alt march red  tband 10x2  Supine: hip flex SLR 10x  Supine: mini bridging with hip add at ball 10x2  Sidelying: hip clamshells 10x2  Sidelying: hip abd SLR 10x2  Standing: lumbar ext 10x  Sitting: gastroc stretch with strap 15\" x 5  Sitting: thoracic ext 10x2  Standing: bicep curls 1#: 10x  Sitting: shoulder ER 1#: 10x  Step up fwd 6\" 10x  Side stepping along barre x 6 laps Ther Ex (30 min)  Supine: BKFO with red tband 10x2  Supine: LTR 10x2  Supine: alt march red tband 10x2  Supine: hip flex SLR 10x  Supine: mini bridging with hip add at ball 10x2  Sitting: Fig 4. Piriformis stretch 20\"x3  Siting; thoracic/lumbar ext 10x  Sitting: gastroc stretch with strap 15\" x 5  standing: bicep curls 1#: 10x  Sitting: shoulder ER 1#: 10x  Step up fwd 6\" 10x  Side stepping along barre x 6 laps          Manual therapy (15 min)  Stm to R piriformis and R glut max Manual therapy (15 min)  Stm to R piriformis, hamstrings and R glut max, R lumbar paraspinals Manual therapy (15 min)  Stm to R piriformis, hamstrings and R glut max, R lumbar paraspinals Manual therapy (15 min)  Stm to R piriformis, R glut max, R lumbar paraspinals Manual therapy (15 min)  Stm to R piriformis, R glut max, R lumbar paraspinals          HEP: BKFO, pelvic tilt, scap retraction, ankle pumps; bridging; LTR; thoracic ext sitting; lumbar ext; and gastroc stretch    Charges: 2 TE, 1 MT    Total Timed Treatment: 45 min  Total Treatment Time: 45 min

## 2024-08-26 ENCOUNTER — OFFICE VISIT (OUTPATIENT)
Dept: PHYSICAL THERAPY | Age: 84
End: 2024-08-26
Attending: PHYSICAL MEDICINE & REHABILITATION
Payer: MEDICARE

## 2024-08-26 PROCEDURE — 97110 THERAPEUTIC EXERCISES: CPT

## 2024-08-26 PROCEDURE — 97140 MANUAL THERAPY 1/> REGIONS: CPT

## 2024-08-26 NOTE — PROGRESS NOTES
Diagnosis:     Lumbar radiculopathy (M54.16)  Lumbar disc disease (M51.9)  Spinal stenosis of lumbar region without neurogenic claudication (M48.061)  Lumbar foraminal stenosis (M48.061)  Retrolisthesis (M43.10)  Chronic bilateral low back pain without sciatica (M54.50,G89.29)   Referring Provider: Derrick Srinivasan  Date of Evaluation:    6/5/2024    Precautions:  Cancer Next MD visit:   8/12/2024  Date of Surgery: n/a   Insurance Primary/Secondary: MEDICARE / [employer] [place]PO     # Auth Visits: 14 visits cert ends 11/5/2024            Subjective: pt reports that she did a lot of cleaning of her house and she feels tired. She has pain at her R hip and R thing. Her back feels okay. Her R shoulder is sore. She is able to walk 20-30 minutes prior to fatiguing . She denies pain with walking.   Pain: Current = 4/10; Best = 0/10; Worst = 5/10    Objective:     Lumbar AROM: 8/23/2024 (* denotes performed with pain)  Flexion: 90%  Extension: 80% (no pain)  Sidebending: R 75% L 75%  Rotation: R 75%; L 75%      Strength: (* denotes performed with pain)  LE 8/23/2024   Hip flexion (L2): R 5/5; L 4+/5  Hip abduction: R 4/5; L 4/5  Hip Extension: R 4/5; L 4/5   Hip ER: R 4+/5; L 4/5  Hip IR: R 5/5; L 5/5  Knee Flexion: R 5/5; L 5/5   Knee extension (L3): R 5/5; L 5/5   DF (L4): R 5/5; L 5/5  Great Toe Ext (L5): R NT/5, L NT/5  PF (S1): R 5/5; L 5/5         Assessment: pt education on benefit of pacing her activities to reduce over-fatiguing. Progressed strengthening in standing with resistance to improve endurance for standing and ambulation activities.        Goals: (to be met in 14 visits)   Pt will improve transversus abdominis recruitment to perform proper isometric contraction without requiring verbal or tactile cuing to promote advancement of therex (met)  Pt will demonstrate good understanding of proper posture and body mechanics to decrease pain and improve spinal safety (met)  Pt will improve lumbar spine AROM extension to >80%  to allow increase ease with reaching overhead to items on shelves. (In progress)  Pt will report improved symptom centralization and absence of radicular symptoms for 3 consecutive days to improve function with ADL (met)  Pt will have decreased paraspinal mm tension to tolerate standing >20 minutes for home activities (met  Pt will demonstrate at least 4+/5 B hip strength to ambulate with adequate hip extension and foot clearance to walk at store without difficulty and reduced risk for falls. (In progress)  Pt will be independent and compliant with comprehensive HEP to maintain progress achieved in PT (in progress)  8/26/2024: goals reviewed with pt.     Post Oswestry Disability Index Score  Post Score: 30 % (8/7/2024  2:56 PM)    -8 % improvement    Plan: Focus on progressive strengthening.     Date: 8/7/2024  Tx#: 6/10 Date: 8/14/2024  Tx#: 7/14 Date: 8/23/2024  Tx#: 8/14 Date: 8/26/2024  Tx#: 9/14   Ther Ex (30 min)  Supine: BKFO 10x2  Supine: LTR 10x2  Supine: hip add at ball 20x5\"  Supine: mini bridging 10x2  Sidelying: hip clamshells 10x2  Standing: lumbar ext 10x  Sitting: gastroc stretch with strap 15\" x 5  Sitting: thoracic ext 10x2  Sitting; scap retraction 10x2  Standing: bicep curls 2#: 10x  Sitting: shoulder ER 2#: 10x Ther Ex (30 min)  Supine: BKFO with red tband 10x2  Supine: LTR 10x2  Supine: alt march red tband 10x2  Supine: hip flex SLR 10x  Supine: mini bridging with hip add at ball 10x2  Sidelying: hip clamshells 10x2  Sidelying: hip abd SLR 10x2  Standing: lumbar ext 10x  Sitting: gastroc stretch with strap 15\" x 5  Sitting: thoracic ext 10x2  Standing: bicep curls 1#: 10x  Sitting: shoulder ER 1#: 10x  Step up fwd 6\" 10x  Side stepping along barre x 6 laps Ther Ex (30 min)  Supine: BKFO with red tband 10x2  Supine: LTR 10x2  Supine: alt march red tband 10x2  Supine: hip flex SLR 10x  Supine: mini bridging with hip add at ball 10x2  Sitting: Fig 4. Piriformis stretch 20\"x3  Siting;  thoracic/lumbar ext 10x  Sitting: gastroc stretch with strap 15\" x 5  standing: bicep curls 1#: 10x  Sitting: shoulder ER 1#: 10x  Step up fwd 6\" 10x  Side stepping along barre x 6 laps Ther ex (30 min)  Sidelying hip clams 20x  Hooklying: mini bridging with hip add at ball 10x2  Hooklying: dead bug 20x  Supine: hip flex SLR 10x2  Standing: hip abd red tband 10x2  Standing: hip ext red tband 10x2  Sitting: LAQ red tband 10x2  Standing: side stepping 20'x 3 laps  Standing with back against wall: scap retraction 10x2  Gait training x 3 min = emphasis on heel strike and reciprocal arm swing; upright posture         Manual therapy (15 min)  Stm to R piriformis, hamstrings and R glut max, R lumbar paraspinals Manual therapy (15 min)  Stm to R piriformis, R glut max, R lumbar paraspinals Manual therapy (15 min)  Stm to R piriformis, R glut max, R lumbar paraspinals Manual therapy (15 min)  Stm to R piriformis, R glut max, R lumbar paraspinals         HEP: BKFO, pelvic tilt, scap retraction, ankle pumps; bridging; LTR; thoracic ext sitting; lumbar ext; and gastroc stretch    Charges: 2 TE, 1 MT    Total Timed Treatment: 45 min  Total Treatment Time: 45 min

## 2024-09-04 ENCOUNTER — OFFICE VISIT (OUTPATIENT)
Dept: PHYSICAL THERAPY | Age: 84
End: 2024-09-04
Attending: PHYSICAL MEDICINE & REHABILITATION
Payer: MEDICARE

## 2024-09-04 PROCEDURE — 97110 THERAPEUTIC EXERCISES: CPT

## 2024-09-04 PROCEDURE — 97140 MANUAL THERAPY 1/> REGIONS: CPT

## 2024-09-04 NOTE — PROGRESS NOTES
Diagnosis:     Lumbar radiculopathy (M54.16)  Lumbar disc disease (M51.9)  Spinal stenosis of lumbar region without neurogenic claudication (M48.061)  Lumbar foraminal stenosis (M48.061)  Retrolisthesis (M43.10)  Chronic bilateral low back pain without sciatica (M54.50,G89.29)   Referring Provider: Derrick Srinivasan  Date of Evaluation:    6/5/2024    Precautions:  Cancer Next MD visit:   8/12/2024  Date of Surgery: n/a   Insurance Primary/Secondary: MEDICARE / [employer] [place] PPO     # Auth Visits: 14 visits cert ends 11/5/2024            Subjective: pt states that she is dealing with R hip bursitis symptoms.   Pain: Current = 4/10; Best = 0/10; Worst = 5/10    Objective:     Lumbar AROM: 8/23/2024 (* denotes performed with pain)  Flexion: 90%  Extension: 80% (no pain)  Sidebending: R 75% L 75%  Rotation: R 75%; L 75%      Strength: (* denotes performed with pain)  LE 8/23/2024   Hip flexion (L2): R 5/5; L 4+/5  Hip abduction: R 4/5; L 4/5  Hip Extension: R 4/5; L 4/5   Hip ER: R 4+/5; L 4/5  Hip IR: R 5/5; L 5/5  Knee Flexion: R 5/5; L 5/5   Knee extension (L3): R 5/5; L 5/5   DF (L4): R 5/5; L 5/5  Great Toe Ext (L5): R NT/5, L NT/5  PF (S1): R 5/5; L 5/5     Hip Special tests: 9/4/2024  Scour test R (-), L (-)   MOIZ R (-), L (-)  FADIR R (-), L (-)      Assessment: pt negative for hip special tests. R lateral thigh pain appears consistent with lumbar dysfunction as she reported relief of thigh pain with lumbar gapping distraction mob and lumbar ext. Symptoms centralized to R upper gluteal region. Pt will add gentle lumbar extension AROM in standing to trial for pain reduction.        Goals: (to be met in 14 visits)   Pt will improve transversus abdominis recruitment to perform proper isometric contraction without requiring verbal or tactile cuing to promote advancement of therex (met)  Pt will demonstrate good understanding of proper posture and body mechanics to decrease pain and improve spinal safety (met)  Pt will  improve lumbar spine AROM extension to >80% to allow increase ease with reaching overhead to items on shelves. (In progress)  Pt will report improved symptom centralization and absence of radicular symptoms for 3 consecutive days to improve function with ADL (met)  Pt will have decreased paraspinal mm tension to tolerate standing >20 minutes for home activities (met  Pt will demonstrate at least 4+/5 B hip strength to ambulate with adequate hip extension and foot clearance to walk at store without difficulty and reduced risk for falls. (In progress)  Pt will be independent and compliant with comprehensive HEP to maintain progress achieved in PT (in progress)  8/26/2024: goals reviewed with pt.     Post Oswestry Disability Index Score  Post Score: 30 % (8/7/2024  2:56 PM)    -8 % improvement    Plan: Focus on progressive strengthening.     Date: 8/7/2024  Tx#: 6/10 Date: 8/14/2024  Tx#: 7/14 Date: 8/23/2024  Tx#: 8/14 Date: 8/26/2024  Tx#: 9/14 Date: 9/4/2024  Tx#: 10/14   Ther Ex (30 min)  Supine: BKFO 10x2  Supine: LTR 10x2  Supine: hip add at ball 20x5\"  Supine: mini bridging 10x2  Sidelying: hip clamshells 10x2  Standing: lumbar ext 10x  Sitting: gastroc stretch with strap 15\" x 5  Sitting: thoracic ext 10x2  Sitting; scap retraction 10x2  Standing: bicep curls 2#: 10x  Sitting: shoulder ER 2#: 10x Ther Ex (30 min)  Supine: BKFO with red tband 10x2  Supine: LTR 10x2  Supine: alt march red tband 10x2  Supine: hip flex SLR 10x  Supine: mini bridging with hip add at ball 10x2  Sidelying: hip clamshells 10x2  Sidelying: hip abd SLR 10x2  Standing: lumbar ext 10x  Sitting: gastroc stretch with strap 15\" x 5  Sitting: thoracic ext 10x2  Standing: bicep curls 1#: 10x  Sitting: shoulder ER 1#: 10x  Step up fwd 6\" 10x  Side stepping along barre x 6 laps Ther Ex (30 min)  Supine: BKFO with red tband 10x2  Supine: LTR 10x2  Supine: alt march red tband 10x2  Supine: hip flex SLR 10x  Supine: mini bridging with hip add at  ball 10x2  Sitting: Fig 4. Piriformis stretch 20\"x3  Siting; thoracic/lumbar ext 10x  Sitting: gastroc stretch with strap 15\" x 5  standing: bicep curls 1#: 10x  Sitting: shoulder ER 1#: 10x  Step up fwd 6\" 10x  Side stepping along barre x 6 laps Ther ex (30 min)  Sidelying hip clams 20x  Hooklying: mini bridging with hip add at ball 10x2  Hooklying: dead bug 20x  Supine: hip flex SLR 10x2  Standing: hip abd red tband 10x2  Standing: hip ext red tband 10x2  Sitting: LAQ red tband 10x2  Standing: side stepping 20'x 3 laps  Standing with back against wall: scap retraction 10x2  Gait training x 3 min = emphasis on heel strike and reciprocal arm swing; upright posture Ther ex (25 min)  Sidelying hip clams 20x  Hooklying: mini bridging with hip add at ball 10x2  Hooklying: dead bug 20x  Standing: hip abd red tband 10x2  Standing: hip ext red tband 10x2  Sitting: LAQ red tband 10x2  Standing: lumbar extension 10x2  Standing with back against wall: scap retraction 10x2  Gait training x 3 min = emphasis on heel strike and reciprocal arm swing; upright posture          Manual therapy (15 min)  Stm to R piriformis, hamstrings and R glut max, R lumbar paraspinals Manual therapy (15 min)  Stm to R piriformis, R glut max, R lumbar paraspinals Manual therapy (15 min)  Stm to R piriformis, R glut max, R lumbar paraspinals Manual therapy (15 min)  Stm to R piriformis, R glut max, R lumbar paraspinals Manual therapy (20 min)  Stm to R piriformis, R glut max, R IT band; R lumbar paraspinals  Sidelying: L3-L5 gapping distraction mob grades II-III          HEP: BKFO, pelvic tilt, scap retraction, ankle pumps; bridging; LTR; thoracic ext sitting; lumbar ext; and gastroc stretch    Charges: 2 TE, 1 MT    Total Timed Treatment: 45 min  Total Treatment Time: 45 min

## 2024-09-09 ENCOUNTER — OFFICE VISIT (OUTPATIENT)
Dept: PHYSICAL THERAPY | Age: 84
End: 2024-09-09
Attending: PHYSICAL MEDICINE & REHABILITATION
Payer: MEDICARE

## 2024-09-09 PROCEDURE — 97110 THERAPEUTIC EXERCISES: CPT

## 2024-09-09 NOTE — PROGRESS NOTES
Diagnosis:     Lumbar radiculopathy (M54.16)  Lumbar disc disease (M51.9)  Spinal stenosis of lumbar region without neurogenic claudication (M48.061)  Lumbar foraminal stenosis (M48.061)  Retrolisthesis (M43.10)  Chronic bilateral low back pain without sciatica (M54.50,G89.29)   Referring Provider: Derrick Srinivasan  Date of Evaluation:    6/5/2024    Precautions:  Cancer Next MD visit:   8/12/2024  Date of Surgery: n/a   Insurance Primary/Secondary: MEDICARE / [employer] [place]     # Auth Visits: 14 visits cert ends 11/5/2024            Subjective: pt is dealing with pleurisy symptoms and will f/u with her doctor on 10/10. She reports that her lower R back and R hip pain is getting worse. She feels that PT has been helpful, but admits that sometimes she hasn't kept up with her exercises. She is walking 30 minutes per day, but she is getting pain that will travel to the front of her R groin.  Pain: Current = 5/10; Best = 0/10; Worst = 5/10    Objective:     Lumbar AROM: 8/23/2024 (* denotes performed with pain)  Flexion: 90%  Extension: 80% (no pain)  Sidebending: R 75% L 75%  Rotation: R 75%; L 75%      Strength: (* denotes performed with pain)  LE 8/23/2024   Hip flexion (L2): R 5/5; L 4+/5  Hip abduction: R 4/5; L 4/5  Hip Extension: R 4/5; L 4/5   Hip ER: R 4+/5; L 4/5  Hip IR: R 5/5; L 5/5  Knee Flexion: R 5/5; L 5/5   Knee extension (L3): R 5/5; L 5/5   DF (L4): R 5/5; L 5/5  Great Toe Ext (L5): R NT/5, L NT/5  PF (S1): R 5/5; L 5/5     Hip Special tests: 9/4/2024  Scour test R (-), L (-)   MOIZ R (-), L (-)  FADIR R (-), L (-)      Assessment: pt responding to lumbar ext bias to abolish R hip pain. She will trial prone lying for HEP and pt instructions provided on how to monitor symptoms.      Goals: (to be met in 14 visits)   Pt will improve transversus abdominis recruitment to perform proper isometric contraction without requiring verbal or tactile cuing to promote advancement of therex (met)  Pt will demonstrate  good understanding of proper posture and body mechanics to decrease pain and improve spinal safety (met)  Pt will improve lumbar spine AROM extension to >80% to allow increase ease with reaching overhead to items on shelves. (In progress)  Pt will report improved symptom centralization and absence of radicular symptoms for 3 consecutive days to improve function with ADL (met)  Pt will have decreased paraspinal mm tension to tolerate standing >20 minutes for home activities (met  Pt will demonstrate at least 4+/5 B hip strength to ambulate with adequate hip extension and foot clearance to walk at store without difficulty and reduced risk for falls. (In progress)  Pt will be independent and compliant with comprehensive HEP to maintain progress achieved in PT (in progress)  8/26/2024: goals reviewed with pt.     Post Oswestry Disability Index Score  Post Score: 30 % (8/7/2024  2:56 PM)    -8 % improvement    Plan: Focus on progressive strengthening.     Date: 8/14/2024  Tx#: 7/14 Date: 8/23/2024  Tx#: 8/14 Date: 8/26/2024  Tx#: 9/14 Date: 9/4/2024  Tx#: 10/14 Date: 9/9/2024  Tx#: 11/14   Ther Ex (30 min)  Supine: BKFO with red tband 10x2  Supine: LTR 10x2  Supine: alt march red tband 10x2  Supine: hip flex SLR 10x  Supine: mini bridging with hip add at ball 10x2  Sidelying: hip clamshells 10x2  Sidelying: hip abd SLR 10x2  Standing: lumbar ext 10x  Sitting: gastroc stretch with strap 15\" x 5  Sitting: thoracic ext 10x2  Standing: bicep curls 1#: 10x  Sitting: shoulder ER 1#: 10x  Step up fwd 6\" 10x  Side stepping along barre x 6 laps Ther Ex (30 min)  Supine: BKFO with red tband 10x2  Supine: LTR 10x2  Supine: alt march red tband 10x2  Supine: hip flex SLR 10x  Supine: mini bridging with hip add at ball 10x2  Sitting: Fig 4. Piriformis stretch 20\"x3  Siting; thoracic/lumbar ext 10x  Sitting: gastroc stretch with strap 15\" x 5  standing: bicep curls 1#: 10x  Sitting: shoulder ER 1#: 10x  Step up fwd 6\" 10x  Side  stepping along barre x 6 laps Ther ex (30 min)  Sidelying hip clams 20x  Hooklying: mini bridging with hip add at ball 10x2  Hooklying: dead bug 20x  Supine: hip flex SLR 10x2  Standing: hip abd red tband 10x2  Standing: hip ext red tband 10x2  Sitting: LAQ red tband 10x2  Standing: side stepping 20'x 3 laps  Standing with back against wall: scap retraction 10x2  Gait training x 3 min = emphasis on heel strike and reciprocal arm swing; upright posture Ther ex (25 min)  Sidelying hip clams 20x  Hooklying: mini bridging with hip add at ball 10x2  Hooklying: dead bug 20x  Standing: hip abd red tband 10x2  Standing: hip ext red tband 10x2  Sitting: LAQ red tband 10x2  Standing: lumbar extension 10x2  Standing with back against wall: scap retraction 10x2  Gait training x 3 min = emphasis on heel strike and reciprocal arm swing; upright posture Ther ex (40 min)  HEP and POC reviewed  Prone: quad stretch performed by clinician 20\"x5  Prone: hamstring curls 20xB  Prone: glut set 20x3\"  Standing: hip abd red tband 10x2  Standing: hip ext red tband 10x2  Sitting: LAQ red tband 10x2  Standing: squat with red tband 10x  Standing: lumbar extension 10x          Manual therapy (15 min)  Stm to R piriformis, R glut max, R lumbar paraspinals Manual therapy (15 min)  Stm to R piriformis, R glut max, R lumbar paraspinals Manual therapy (15 min)  Stm to R piriformis, R glut max, R lumbar paraspinals Manual therapy (20 min)  Stm to R piriformis, R glut max, R IT band; R lumbar paraspinals  Sidelying: L3-L5 gapping distraction mob grades II-III           HEP: BKFO, pelvic tilt, scap retraction, ankle pumps; bridging; LTR; thoracic ext sitting; lumbar ext; and gastroc stretch; prone lying; prone glut set    Charges: 3 TE    Total Timed Treatment: 40 min  Total Treatment Time: 40 min

## 2024-09-18 ENCOUNTER — TELEPHONE (OUTPATIENT)
Dept: PHYSICAL THERAPY | Age: 84
End: 2024-09-18

## 2024-09-20 ENCOUNTER — APPOINTMENT (OUTPATIENT)
Dept: PHYSICAL THERAPY | Age: 84
End: 2024-09-20
Attending: PHYSICAL MEDICINE & REHABILITATION
Payer: MEDICARE

## 2024-10-08 ENCOUNTER — LAB ENCOUNTER (OUTPATIENT)
Dept: LAB | Facility: HOSPITAL | Age: 84
End: 2024-10-08
Attending: NURSE PRACTITIONER
Payer: MEDICARE

## 2024-10-08 DIAGNOSIS — M35.3 POLYMYALGIA RHEUMATICA (HCC): Primary | ICD-10-CM

## 2024-10-08 LAB — ERYTHROCYTE [SEDIMENTATION RATE] IN BLOOD: 6 MM/HR

## 2024-10-08 PROCEDURE — 36415 COLL VENOUS BLD VENIPUNCTURE: CPT

## 2024-10-08 PROCEDURE — 85652 RBC SED RATE AUTOMATED: CPT

## 2024-10-14 ENCOUNTER — TELEPHONE (OUTPATIENT)
Dept: PHYSICAL MEDICINE AND REHAB | Facility: CLINIC | Age: 84
End: 2024-10-14

## 2024-10-14 ENCOUNTER — APPOINTMENT (OUTPATIENT)
Dept: PHYSICAL THERAPY | Age: 84
End: 2024-10-14
Attending: PHYSICAL MEDICINE & REHABILITATION
Payer: MEDICARE

## 2024-10-14 ENCOUNTER — OFFICE VISIT (OUTPATIENT)
Dept: PHYSICAL MEDICINE AND REHAB | Facility: CLINIC | Age: 84
End: 2024-10-14
Payer: MEDICARE

## 2024-10-14 ENCOUNTER — HOSPITAL ENCOUNTER (OUTPATIENT)
Dept: GENERAL RADIOLOGY | Facility: HOSPITAL | Age: 84
Discharge: HOME | End: 2024-10-14
Attending: PHYSICAL MEDICINE & REHABILITATION
Payer: MEDICARE

## 2024-10-14 VITALS — WEIGHT: 135 LBS | BODY MASS INDEX: 26.5 KG/M2 | HEIGHT: 60 IN

## 2024-10-14 DIAGNOSIS — M25.511 ACUTE PAIN OF RIGHT SHOULDER: ICD-10-CM

## 2024-10-14 DIAGNOSIS — I50.22 CHRONIC SYSTOLIC CONGESTIVE HEART FAILURE (HCC): ICD-10-CM

## 2024-10-14 DIAGNOSIS — R80.9 TYPE 2 DIABETES MELLITUS WITH MICROALBUMINURIA (HCC): ICD-10-CM

## 2024-10-14 DIAGNOSIS — M48.061 SPINAL STENOSIS OF LUMBAR REGION WITHOUT NEUROGENIC CLAUDICATION: ICD-10-CM

## 2024-10-14 DIAGNOSIS — M54.16 LUMBAR RADICULOPATHY: Primary | ICD-10-CM

## 2024-10-14 DIAGNOSIS — M51.9 LUMBAR DISC DISEASE: ICD-10-CM

## 2024-10-14 DIAGNOSIS — M75.121 NONTRAUMATIC COMPLETE TEAR OF RIGHT ROTATOR CUFF: ICD-10-CM

## 2024-10-14 DIAGNOSIS — M54.41 CHRONIC BILATERAL LOW BACK PAIN WITH BILATERAL SCIATICA: ICD-10-CM

## 2024-10-14 DIAGNOSIS — G89.29 CHRONIC BILATERAL LOW BACK PAIN WITH BILATERAL SCIATICA: ICD-10-CM

## 2024-10-14 DIAGNOSIS — E11.29 TYPE 2 DIABETES MELLITUS WITH MICROALBUMINURIA (HCC): ICD-10-CM

## 2024-10-14 DIAGNOSIS — K22.70 BARRETT'S ESOPHAGUS WITHOUT DYSPLASIA: ICD-10-CM

## 2024-10-14 DIAGNOSIS — M19.011 PRIMARY OSTEOARTHRITIS OF RIGHT SHOULDER: ICD-10-CM

## 2024-10-14 DIAGNOSIS — M43.10 RETROLISTHESIS: ICD-10-CM

## 2024-10-14 DIAGNOSIS — M48.061 LUMBAR FORAMINAL STENOSIS: ICD-10-CM

## 2024-10-14 DIAGNOSIS — M54.42 CHRONIC BILATERAL LOW BACK PAIN WITH BILATERAL SCIATICA: ICD-10-CM

## 2024-10-14 PROCEDURE — 73030 X-RAY EXAM OF SHOULDER: CPT | Performed by: PHYSICAL MEDICINE & REHABILITATION

## 2024-10-14 NOTE — TELEPHONE ENCOUNTER
Per CMS Guidelines -no authorization is required for Bilateral L4-5 TFESI under fluoroscopic guidance  CPT code: 99704-74       Status: Authorization is not required based on medical necessity however is not a guarantee of payment and may be subject to review once claim is submitted-Covered Benefit.  This will be #2 in a rolling 12 months

## 2024-10-14 NOTE — PROGRESS NOTES
Low Back Pain [facility]    Chief Complaint:   Chief Complaint   Patient presents with    Follow - Up     LOV 8/12/24 pt is here for a follow up on low back pain and states it's now radiating into her groin areas into her buttocks , as well as outer right leg. Takes tramadol and gabapentin.Currently in physical therapy. Pain 8/10     Nursing note reviewed and verified.    Patient was last seen on 8/12/2024.  In the beginning of September, she developed right lateral thigh and hip pain.  She saw Dr. Galarza and he did a right greater trochanteric bursal injection which did not help on 9/18/2024.  On 10/8/2024, she saw Dr. Green and he prescribed Ultram for the pain which has not helped.  The buttock pain started yesterday.    The PT has been helping the low back, but she thinks that this might have caused her to have the right shoulder pain again.        Description of the Pain  The pain is located in the bilateral low back.    The pain radiates to the bilateral buttock, bilateral groin, right lateral hip, and right lateral thigh..  The pain at its best is 5/10. The pain at its worst is 9/10. The pain is currently  7/10.  The pain is described as a(n) sharp sensation.    The pain is worse when bending, standing, walking, and in the morning.    The pain is better sitting and going from sitting to standing.  There is no numbness.  There is no tingling in the legs.  There is not weakness in both legs.     She is having the right shoulder pain again.    Past Medical History   Past Medical History:    Atherosclerosis of coronary artery    Castañeda's esophagus without dysplasia    CAD (coronary artery disease)    s/p stent placement 2004;77 year old female with PMHx CAD s/p PCI in 2004/2005, s/p CABG 2V (LIMA-RI, GEORGINA-RCA) 8/2017, s/p GERARDO to LM 3/22/2018, recent LHC on 3/29/2018 s/p POBA to pRCA who presents for staged PCI of RCA.    Congestive heart disease (HCC)    Coronary atherosclerosis    Essential hypertension    GERD  (gastroesophageal reflux disease)    H/O hammer toe correction    Hearing impairment    High blood pressure    High cholesterol    HTN (hypertension)    Hyperlipidemia    Osteoarthritis    S/P appendectomy    S/p bilateral blepharoplasty    S/P CABG x 2    S/P cholecystectomy    S/P tonsillectomy and adenoidectomy    SNHL (sensorineural hearing loss)    bilateral hearing aids    Tobacco use    Type II or unspecified type diabetes mellitus without mention of complication, not stated as uncontrolled    Varicose veins       Past Surgical History   Past Surgical History:   Procedure Laterality Date    Appendectomy      Appendectomy      Cabg      Hysterectomy      Removal gallbladder      Tonsillectomy         Family History   Family History   Problem Relation Age of Onset    Heart Disorder Father     Diabetes Father     Heart Disorder Mother     Hypertension Daughter     Lipids Daughter     Obesity Daughter     Diabetes Daughter        Social History   Social History     Socioeconomic History    Marital status:      Spouse name: Not on file    Number of children: Not on file    Years of education: Not on file    Highest education level: Not on file   Occupational History    Occupation: retired   Tobacco Use    Smoking status: Former     Current packs/day: 0.80     Average packs/day: 0.8 packs/day for 55.0 years (44.0 ttl pk-yrs)     Types: Cigarettes    Smokeless tobacco: Never    Tobacco comments:     5 per day currently   Vaping Use    Vaping status: Never Used   Substance and Sexual Activity    Alcohol use: No     Alcohol/week: 0.0 standard drinks of alcohol    Drug use: No    Sexual activity: Not Currently   Other Topics Concern     Service Not Asked    Blood Transfusions Not Asked    Caffeine Concern Not Asked    Occupational Exposure Not Asked    Hobby Hazards Not Asked    Sleep Concern Not Asked    Stress Concern Not Asked    Weight Concern Not Asked    Special Diet Not Asked    Back Care Not  Asked    Exercise Yes    Bike Helmet Not Asked    Seat Belt Not Asked    Self-Exams Not Asked   Social History Narrative    .  Lives alone.    25 grandchildren.     Social Drivers of Health     Financial Resource Strain: Not on file   Food Insecurity: Not on file   Transportation Needs: Not on file   Physical Activity: Not on file   Stress: Not on file   Social Connections: Not on file   Housing Stability: Not on file       PE:  The patient does appear in her stated age in mild distress.  The patient is well groomed.    Psychiatric:  The patient is alert and oriented x 3.  The patient has a normal affect and mood.      Respiratory:  No acute respiratory distress. Patient does not have a cough.    HEENT:  Extraocular muscles are intact. There is no kern icterus. Pupils are equal, round, and reactive to light. No redness or discharge bilaterally.    Skin:  There are no rashes or lesions.    Vitals:  There were no vitals filed for this visit.    Gait:    Gait: Normal gait   Sit to Stand: no difficulty      Lumbar Spine:    Scoliosis: Thoracic levoscoliosis that is mild-moderate and Lumbar dextroscoliosis that is moderate   Lumbar Flexion: 70 degrees Gives the patient pain in the bilateral low back.     Lumbar Spine Palpation:    Spinous Processes: Tender at L4, L5, and S1   Z-joints: Tender at  bilateral L5-S1   SIJ: Tender at right SIJ   Piriformis Muscle: Non-tender bilateral Piriformis muscles   Greater Trochanteric Bursa: Tender at bilateral Greater Trochanteric Bursa(s)     Vascular lower extremity:   Dorsalis pedis pulse-RIGHT 2+   Dorsalis pedis pulse-LEFT 2+   Tibialis posterior pulse-RIGHT 2+   Tibialis posterior pulse-LEFT 2+     Neurological Lower Extremity:    Light Touch Sensation: Intact in bilateral Lower Extremities   LE Muscle Strength: All LE strength measurements 5/5 except:  Hamstring RIGHT:   4/5  Hamstring LEFT:   4+/5  Hip Flexion RIGHT:  3+/5  Extensor Hallucis Longus LEFT:   4/5   RIGHT  plantar reflexes: downward response   LEFT plantar reflexes: downward response   Reflexes: 2+ in bilateral lower extremities except:  Right patellar tendon which are 1+     Radiology Imaging:  I reviewed with the patient her X-ray of the shoulder right from today.      Assessment  1. right L3 and bilateral L5-S1 radiculopathy, left L3 radiculitis    2. L5-S1 left mild-mod, L4-5 left mild/right mod, L3-4 left mod-severe/right mild foraminal stenosis    3. L3-4 severe, L4-5 mod-severe spinal stenosis    4. L1-2 mild-mod diffuse & right mod foraminal, L2-3 mod diffuse, L3-4 mod-large diffuse, L4-5 mod diffuse, L5-S1 bilateral mild-mod far lateral & mild central bulging discs    5. L1-2 grade 1, L2-3 grade 1, L3-4 grade 1 Retrolisthesis    6. Chronic bilateral low back pain with bilateral sciatica    7. Castañeda's esophagus without dysplasia    8. Type 2 diabetes mellitus with microalbuminuria (HCC)    9. Chronic systolic congestive heart failure (HCC)    10. Acute pain of right shoulder    11. Primary osteoarthritis of right shoulder: mild-moderate glenohumeral joinjt and moderate AC joint    12. Nontraumatic complete tear of right rotator cuff         Plan  I will perform bilateral L4 TFESI(s).    She got x-rays of the right shoulder which I reviewed with her in the office today.    I will do a right shoulder injection 2 weeks after she has had the above lumbar injections.    She will get back into the PT for the lumbar spine once she has had the bilateral L4 TFESI's.    The patient will continue with her home exercise program.    The patient will continue with their current pain medications.    The patient will follow up in 3-4 months, but the patient will call me 2 weeks after having the injection to let me know how the injection worked.    The patient understands and agrees with the stated plan.  Derrick Srinivasan MD  10/14/2024

## 2024-10-14 NOTE — PATIENT INSTRUCTIONS
Plan  I will perform bilateral L4 TFESI(s).    She got x-rays of the right shoulder which I reviewed with her in the office today.    I will do a right shoulder injection 2 weeks after she has had the above lumbar injections.    She will get back into the PT for the lumbar spine once she has had the bilateral L4 TFESI's.    The patient will continue with her home exercise program.    The patient will continue with their current pain medications.    The patient will follow up in 3-4 months, but the patient will call me 2 weeks after having the injection to let me know how the injection worked.

## 2024-10-21 ENCOUNTER — APPOINTMENT (OUTPATIENT)
Dept: PHYSICAL THERAPY | Age: 84
End: 2024-10-21
Attending: PHYSICAL MEDICINE & REHABILITATION
Payer: MEDICARE

## 2024-10-22 ENCOUNTER — MED REC SCAN ONLY (OUTPATIENT)
Dept: PHYSICAL MEDICINE AND REHAB | Facility: CLINIC | Age: 84
End: 2024-10-22

## 2024-10-22 ENCOUNTER — TELEPHONE (OUTPATIENT)
Dept: PHYSICAL MEDICINE AND REHAB | Facility: CLINIC | Age: 84
End: 2024-10-22

## 2024-10-22 NOTE — TELEPHONE ENCOUNTER
Per CMS Guidelines -no authorization is required for Right shoulder injection   CPT codes: 05805,        Status: Authorization is not required based on medical necessity however is not a guarantee of payment and may be subject to review once claim is submitted-Covered Benefit.

## 2024-10-30 ENCOUNTER — APPOINTMENT (OUTPATIENT)
Dept: PHYSICAL THERAPY | Age: 84
End: 2024-10-30
Attending: PHYSICAL MEDICINE & REHABILITATION
Payer: MEDICARE

## 2024-11-20 ENCOUNTER — APPOINTMENT (OUTPATIENT)
Dept: GENERAL RADIOLOGY | Facility: HOSPITAL | Age: 84
End: 2024-11-20
Attending: STUDENT IN AN ORGANIZED HEALTH CARE EDUCATION/TRAINING PROGRAM
Payer: MEDICARE

## 2024-11-20 ENCOUNTER — HOSPITAL ENCOUNTER (EMERGENCY)
Facility: HOSPITAL | Age: 84
Discharge: HOME | End: 2024-11-20
Attending: STUDENT IN AN ORGANIZED HEALTH CARE EDUCATION/TRAINING PROGRAM
Payer: MEDICARE

## 2024-11-20 VITALS
RESPIRATION RATE: 20 BRPM | DIASTOLIC BLOOD PRESSURE: 111 MMHG | HEIGHT: 61 IN | WEIGHT: 111 LBS | OXYGEN SATURATION: 97 % | TEMPERATURE: 98 F | SYSTOLIC BLOOD PRESSURE: 136 MMHG | HEART RATE: 66 BPM | BODY MASS INDEX: 20.96 KG/M2

## 2024-11-20 DIAGNOSIS — R06.09 DYSPNEA ON EXERTION: ICD-10-CM

## 2024-11-20 DIAGNOSIS — R53.1 WEAKNESS GENERALIZED: Primary | ICD-10-CM

## 2024-11-20 LAB
ANION GAP SERPL CALC-SCNC: 9 MMOL/L (ref 0–18)
ATRIAL RATE: 72 BPM
BASOPHILS # BLD AUTO: 0.03 X10(3) UL (ref 0–0.2)
BASOPHILS NFR BLD AUTO: 0.2 %
BILIRUB UR QL: NEGATIVE
BUN BLD-MCNC: 25 MG/DL (ref 9–23)
BUN/CREAT SERPL: 33.3 (ref 10–20)
CALCIUM BLD-MCNC: 10.3 MG/DL (ref 8.7–10.4)
CHLORIDE SERPL-SCNC: 103 MMOL/L (ref 98–112)
CLARITY UR: CLEAR
CO2 SERPL-SCNC: 29 MMOL/L (ref 21–32)
COLOR UR: YELLOW
CREAT BLD-MCNC: 0.75 MG/DL
D DIMER PPP FEU-MCNC: 0.6 UG/ML FEU (ref ?–0.83)
DEPRECATED RDW RBC AUTO: 49.4 FL (ref 35.1–46.3)
EGFRCR SERPLBLD CKD-EPI 2021: 79 ML/MIN/1.73M2 (ref 60–?)
EOSINOPHIL # BLD AUTO: 0.13 X10(3) UL (ref 0–0.7)
EOSINOPHIL NFR BLD AUTO: 1 %
ERYTHROCYTE [DISTWIDTH] IN BLOOD BY AUTOMATED COUNT: 14 % (ref 11–15)
FLUAV + FLUBV RNA SPEC NAA+PROBE: NEGATIVE
FLUAV + FLUBV RNA SPEC NAA+PROBE: NEGATIVE
GLUCOSE BLD-MCNC: 177 MG/DL (ref 70–99)
GLUCOSE UR-MCNC: 100 MG/DL
HCT VFR BLD AUTO: 40 %
HGB BLD-MCNC: 12.8 G/DL
HGB UR QL STRIP.AUTO: NEGATIVE
IMM GRANULOCYTES # BLD AUTO: 0.18 X10(3) UL (ref 0–1)
IMM GRANULOCYTES NFR BLD: 1.4 %
KETONES UR-MCNC: NEGATIVE MG/DL
LEUKOCYTE ESTERASE UR QL STRIP.AUTO: NEGATIVE
LYMPHOCYTES # BLD AUTO: 0.92 X10(3) UL (ref 1–4)
LYMPHOCYTES NFR BLD AUTO: 7.1 %
MCH RBC QN AUTO: 30.7 PG (ref 26–34)
MCHC RBC AUTO-ENTMCNC: 32 G/DL (ref 31–37)
MCV RBC AUTO: 95.9 FL
MONOCYTES # BLD AUTO: 0.58 X10(3) UL (ref 0.1–1)
MONOCYTES NFR BLD AUTO: 4.5 %
NEUTROPHILS # BLD AUTO: 11.18 X10 (3) UL (ref 1.5–7.7)
NEUTROPHILS # BLD AUTO: 11.18 X10(3) UL (ref 1.5–7.7)
NEUTROPHILS NFR BLD AUTO: 85.8 %
NITRITE UR QL STRIP.AUTO: NEGATIVE
NT-PROBNP SERPL-MCNC: 1099 PG/ML (ref ?–450)
OSMOLALITY SERPL CALC.SUM OF ELEC: 301 MOSM/KG (ref 275–295)
P AXIS: 52 DEGREES
P-R INTERVAL: 124 MS
PH UR: 6.5 [PH] (ref 5–8)
PLATELET # BLD AUTO: 271 10(3)UL (ref 150–450)
POTASSIUM SERPL-SCNC: 4.6 MMOL/L (ref 3.5–5.1)
PROT UR-MCNC: NEGATIVE MG/DL
Q-T INTERVAL: 380 MS
QRS DURATION: 82 MS
QTC CALCULATION (BEZET): 416 MS
R AXIS: 36 DEGREES
RBC # BLD AUTO: 4.17 X10(6)UL
RSV RNA SPEC NAA+PROBE: NEGATIVE
SARS-COV-2 RNA RESP QL NAA+PROBE: NOT DETECTED
SODIUM SERPL-SCNC: 141 MMOL/L (ref 136–145)
SP GR UR STRIP: 1.01 (ref 1–1.03)
T AXIS: 37 DEGREES
TROPONIN I SERPL HS-MCNC: 15 NG/L
TSI SER-ACNC: 1.7 UIU/ML (ref 0.55–4.78)
UROBILINOGEN UR STRIP-ACNC: NORMAL
VENTRICULAR RATE: 72 BPM
WBC # BLD AUTO: 13 X10(3) UL (ref 4–11)

## 2024-11-20 PROCEDURE — 93010 ELECTROCARDIOGRAM REPORT: CPT

## 2024-11-20 PROCEDURE — 85379 FIBRIN DEGRADATION QUANT: CPT | Performed by: STUDENT IN AN ORGANIZED HEALTH CARE EDUCATION/TRAINING PROGRAM

## 2024-11-20 PROCEDURE — 99284 EMERGENCY DEPT VISIT MOD MDM: CPT

## 2024-11-20 PROCEDURE — 85025 COMPLETE CBC W/AUTO DIFF WBC: CPT | Performed by: STUDENT IN AN ORGANIZED HEALTH CARE EDUCATION/TRAINING PROGRAM

## 2024-11-20 PROCEDURE — 36415 COLL VENOUS BLD VENIPUNCTURE: CPT

## 2024-11-20 PROCEDURE — 80048 BASIC METABOLIC PNL TOTAL CA: CPT | Performed by: STUDENT IN AN ORGANIZED HEALTH CARE EDUCATION/TRAINING PROGRAM

## 2024-11-20 PROCEDURE — 93005 ELECTROCARDIOGRAM TRACING: CPT

## 2024-11-20 PROCEDURE — 84443 ASSAY THYROID STIM HORMONE: CPT | Performed by: STUDENT IN AN ORGANIZED HEALTH CARE EDUCATION/TRAINING PROGRAM

## 2024-11-20 PROCEDURE — 0241U SARS-COV-2/FLU A AND B/RSV BY PCR (GENEXPERT): CPT | Performed by: STUDENT IN AN ORGANIZED HEALTH CARE EDUCATION/TRAINING PROGRAM

## 2024-11-20 PROCEDURE — 81003 URINALYSIS AUTO W/O SCOPE: CPT | Performed by: STUDENT IN AN ORGANIZED HEALTH CARE EDUCATION/TRAINING PROGRAM

## 2024-11-20 PROCEDURE — 71045 X-RAY EXAM CHEST 1 VIEW: CPT | Performed by: STUDENT IN AN ORGANIZED HEALTH CARE EDUCATION/TRAINING PROGRAM

## 2024-11-20 PROCEDURE — 84484 ASSAY OF TROPONIN QUANT: CPT | Performed by: STUDENT IN AN ORGANIZED HEALTH CARE EDUCATION/TRAINING PROGRAM

## 2024-11-20 PROCEDURE — 99285 EMERGENCY DEPT VISIT HI MDM: CPT

## 2024-11-20 PROCEDURE — 83880 ASSAY OF NATRIURETIC PEPTIDE: CPT | Performed by: STUDENT IN AN ORGANIZED HEALTH CARE EDUCATION/TRAINING PROGRAM

## 2024-11-20 RX ORDER — FLUTICASONE FUROATE, UMECLIDINIUM BROMIDE AND VILANTEROL TRIFENATATE 100; 62.5; 25 UG/1; UG/1; UG/1
1 POWDER RESPIRATORY (INHALATION) DAILY
COMMUNITY

## 2024-11-20 RX ORDER — MULTIVIT-MIN/IRON FUM/FOLIC AC 7.5 MG-4
1 TABLET ORAL DAILY
COMMUNITY

## 2024-11-20 RX ORDER — PREDNISONE 1 MG/1
TABLET ORAL AS DIRECTED
COMMUNITY

## 2024-11-20 RX ORDER — PREDNISONE 5 MG/1
5 TABLET ORAL DAILY
COMMUNITY

## 2024-11-20 RX ORDER — SENNOSIDES 8.6 MG
1300 CAPSULE ORAL 2 TIMES DAILY
COMMUNITY

## 2024-11-20 NOTE — ED INITIAL ASSESSMENT (HPI)
Pt ambulatory to ED A&O x 4 w/ c/o MAXIMUS x 1 week.  Pt w/ hx lung ca & COPD, saw MD about a week ago and was placed on Prednisone and inhaler.  Pt also reporting generalized weakness and malaise x 1 week.  Pt denies any chest pain.

## 2024-11-20 NOTE — ED PROVIDER NOTES
History     Chief Complaint   Patient presents with    Difficulty Breathing       HPI    84 year old female with history of CAD status post stent and CABG, heart failure, COPD, hypertension, GERD, prior lung cancer presents with a couple weeks of progressively worsening generalized fatigue.  Reports just walking to the restroom she feels like she ran a marathon.  She had some associated dyspnea, saw pulmonary recently and started on prednisone burst which has helped her breathing.  She has no chest pain.  No recent illness or injuries.  Reports stools are normal color.          Past Medical History:    Atherosclerosis of coronary artery    Castañeda's esophagus without dysplasia    CAD (coronary artery disease)    s/p stent placement 2004;77 year old female with PMHx CAD s/p PCI in 2004/2005, s/p CABG 2V (LIMA-RI, GEORGINA-RCA) 8/2017, s/p GERARDO to LM 3/22/2018, recent LHC on 3/29/2018 s/p POBA to pRCA who presents for staged PCI of RCA.    Congestive heart disease (HCC)    COPD (chronic obstructive pulmonary disease) (HCC)    Coronary atherosclerosis    Essential hypertension    GERD (gastroesophageal reflux disease)    H/O hammer toe correction    Hearing impairment    High blood pressure    High cholesterol    HTN (hypertension)    Hyperlipidemia    Lung cancer (HCC)    Osteoarthritis    S/P appendectomy    S/p bilateral blepharoplasty    S/P CABG x 2    S/P cholecystectomy    S/P tonsillectomy and adenoidectomy    SNHL (sensorineural hearing loss)    bilateral hearing aids    Tobacco use    Type II or unspecified type diabetes mellitus without mention of complication, not stated as uncontrolled    Varicose veins       Past Surgical History:   Procedure Laterality Date    Appendectomy      Appendectomy      Cabg      Hysterectomy      Removal gallbladder      Tonsillectomy         Social History     Socioeconomic History    Marital status:    Occupational History    Occupation: retired   Tobacco Use    Smoking  status: Former     Current packs/day: 0.80     Average packs/day: 0.8 packs/day for 55.0 years (44.0 ttl pk-yrs)     Types: Cigarettes    Smokeless tobacco: Never   Vaping Use    Vaping status: Never Used   Substance and Sexual Activity    Alcohol use: No     Alcohol/week: 0.0 standard drinks of alcohol    Drug use: No    Sexual activity: Not Currently   Other Topics Concern    Exercise Yes   Social History Narrative    .  Lives alone.    25 grandchildren.                   Physical Exam     ED Triage Vitals [11/20/24 0931]   /66   Pulse 69   Resp 20   Temp 98.2 °F (36.8 °C)   Temp src Oral   SpO2 99 %   O2 Device None (Room air)       Physical Exam  Constitutional:       General: She is not in acute distress.  Eyes:      Extraocular Movements: Extraocular movements intact.   Cardiovascular:      Rate and Rhythm: Normal rate and regular rhythm.      Pulses: Normal pulses.      Heart sounds: Normal heart sounds.   Pulmonary:      Effort: Pulmonary effort is normal. No respiratory distress.      Breath sounds: Normal breath sounds.   Abdominal:      General: Abdomen is flat. There is no distension.      Tenderness: There is no abdominal tenderness. There is no guarding.   Musculoskeletal:         General: No swelling or deformity.      Cervical back: Normal range of motion.   Skin:     General: Skin is warm.   Neurological:      General: No focal deficit present.      Mental Status: She is alert.              ED Course     Labs Reviewed   CBC WITH DIFFERENTIAL WITH PLATELET - Abnormal; Notable for the following components:       Result Value    WBC 13.0 (*)     RDW-SD 49.4 (*)     Neutrophil Absolute Prelim 11.18 (*)     Neutrophil Absolute 11.18 (*)     Lymphocyte Absolute 0.92 (*)     All other components within normal limits   BASIC METABOLIC PANEL (8) - Abnormal; Notable for the following components:    Glucose 177 (*)     BUN 25 (*)     BUN/CREA Ratio 33.3 (*)     Calculated Osmolality 301 (*)      All other components within normal limits   PRO BETA NATRIURETIC PEPTIDE - Abnormal; Notable for the following components:    Pro-Beta Natriuretic Peptide 1,099 (*)     All other components within normal limits   URINALYSIS, ROUTINE - Abnormal; Notable for the following components:    Glucose Urine 100 (*)     All other components within normal limits   TROPONIN I HIGH SENSITIVITY - Normal   D-DIMER - Normal   TSH W REFLEX TO FREE T4 - Normal   SARS-COV-2/FLU A AND B/RSV BY PCR (GENEXPERT) - Normal    Narrative:     This test is intended for the qualitative detection and differentiation of SARS-CoV-2, influenza A, influenza B, and respiratory syncytial virus (RSV) viral RNA in nasopharyngeal or nares swabs from individuals suspected of respiratory viral infection consistent with COVID-19 by their healthcare provider. Signs and symptoms of respiratory viral infection due to SARS-CoV-2, influenza, and RSV can be similar.    Test performed using the Xpert Xpress SARS-CoV-2/FLU/RSV (real time RT-PCR)  assay on the [employer] instrument, [employer], [place] [address].   This test is being used under the Food and Drug Administration's Emergency Use Authorization.    The authorized Fact Sheet for Healthcare Providers for this assay is available upon request from the laboratory.   RAINBOW DRAW LAVENDER   RAINBOW DRAW LIGHT GREEN   RAINBOW DRAW BLUE     XR CHEST AP PORTABLE  (CPT=71045)    Result Date: 11/20/2024  CONCLUSION:  1. No acute airspace disease.  Previously seen irregular spiculated opacity in the right hilum is less conspicuous than seen previously.  Correlate clinically and follow-up CT imaging is advised.  See above.    Dictated by (CST): Gregorio Aponte MD on 11/20/2024 at 11:32 AM     Finalized by (CST): Gregorio Aponte MD on 11/20/2024 at 11:40 AM               MDM     Vitals:    11/20/24 0931 11/20/24 1100 11/20/24 1145 11/20/24 1300   BP: 121/66 (!) 140/122 (!) 170/80 (!) 136/111   Pulse: 69 60 65 66   Resp:  20 24 20 20   Temp: 98.2 °F (36.8 °C)      TempSrc: Oral      SpO2: 99% 98% 99% 97%   Weight: 50.3 kg      Height: 154.9 cm (5' 1\")          Differential includes cardiopulmonary etiology, anemia, viral syndrome, deconditioning.  She is moving air well with normal oxygen.  Blood pressure is slightly elevated.  Will check cardiac enzymes, x-ray, D-dimer for PE screening    ED Course as of 11/20/24 1330  ------------------------------------------------------------  Time: 11/20 0645  Comment: EKG interpretation by me: EKG sinus rhythm at a rate of 72, axis nomal , lvh noted, nonspec st changes  ------------------------------------------------------------  Time: 11/20 1141  Comment: My interpretation of x-ray with no large pleural effusions or consolidations.  ------------------------------------------------------------  Time: 11/20 1141  Comment: Labs with elevated BNP, negative troponin, D-dimer low, noted leukocytosis (likely from steroid use) without anemia.  Reassuring renal function  ------------------------------------------------------------  Time: 11/20 1151  Comment: Discussed findings with patient and family bedside.  ------------------------------------------------------------  Time: 11/20 1155  Comment: Will expand workup for thyroid, viral panel, urinalysis and admit for further care.  ------------------------------------------------------------  Time: 11/20 1241  Comment: Patient discussed with hospitalist and decided to go home, hospitalist planning to have patient set up with home health and a repeat evaluation by primary care physician in 2 days.  Patient and daughter at bedside are very comfortable with this plan.         Disposition and Plan     Clinical Impression:  1. Weakness generalized    2. Dyspnea on exertion        Disposition:  Discharge    Follow-up:  Dalton Green MD  [address] [place] [id number] [phone]    Schedule an appointment as soon as possible for a  visit in 3 day(s)  for hospital follow-up    Dejan Magana MD  [address] [facility]  [address]  [facility] [address]  [phone]    Follow up  for hospital follow-up      Medications Prescribed:  Discharge Medication List as of 11/20/2024  1:18 PM

## 2024-11-20 NOTE — HOME CARE LIAISON
10-Mar-2019 08:37 [facility]. Spoke w/ patient/ patient's sister who is agreeable with [facility]. Contact information placed on AVS.    10-Mar-2019 08:40

## 2024-11-20 NOTE — CM/SW NOTE
Spoke with Dr. Calderon, and ERCM will help facilitate an ER discharge with [facility].    Spoke with patient and patient's dtevangelina Hyman, at [facility], stating that she is agreeable to discharge with [facility].  ERCM stated that [facility] is our preferred provider and they are also agreeable to [facility], should they be available.    Referrals for HHC sent via [facility].    115pm    [facility] available and reserved by ERCM.    Patient updated that [facility] will be calling patient at [phone], and they can leave a message if she does not answer, and/or [facility] can call haven Hyman at [phone] to make visit arrangements.    Patient and haven Hyman v/u.

## 2024-11-20 NOTE — H&P
[facility]   INTERNAL MEDICINE HISTORY & PHYSICAL      CHIEF COMPLAINT   Difficulty Breathing    HISTORY OF THE PRESENT ILLNESS    Annita Mendieta - 84 year old female presenting with above CC.    History obtained from patient w/ additional history from daughter at bedside & review of outside records in care everywhere.  Pt w h/o lung cancer, emphysema recent flare.   Was doing better w increasing steroids. Decreased back to 5mg after 40mg burst x 5 days. Subsequently has been feeling weak, fatigued, no strength or energy.  She specifically denies chest pain or worsening of her dyspnea.  Dr. Magana told her to increase her inhalers (albuterol per note). Pt states she has been using Trellegy 4 times daily, seems she might have confused her PTA albuterol.   No specific numbness, weakness, MSK aches/pain just general malaise.  Hasn't been eating or drinking as well.  Has been a little more shaky but thinks this is more from inhalers & steroids.  Lives alone w dog, daughter in area helps    In ED, afebrile, BP elevated. CBC w leukocytosis. BMP stable. Trop neg. CXR w improved opacity. EKG unchanged from priors. Discussed w EM Dr. Tuttle, IM asked to see to eval for possible admission.    REVIEW OF SYSTEMS   Remainder of 12-point ROS negative unless discussed in HPI.     PATIENT HISTORIES  PMHx:  She has a past medical history of Atherosclerosis of coronary artery, Castañeda's esophagus without dysplasia (11/21/2016), CAD (coronary artery disease), Congestive heart disease (HCC), COPD (chronic obstructive pulmonary disease) (HCC), Coronary atherosclerosis, Essential hypertension, GERD (gastroesophageal reflux disease), H/O hammer toe correction, Hearing impairment, High blood pressure, High cholesterol, HTN (hypertension), Hyperlipidemia, Lung cancer (HCC), Osteoarthritis, S/P appendectomy, S/p bilateral blepharoplasty, S/P CABG x 2, S/P cholecystectomy, S/P tonsillectomy and adenoidectomy, SNHL  (sensorineural hearing loss), Tobacco use, Type II or unspecified type diabetes mellitus without mention of complication, not stated as uncontrolled, and Varicose veins.  PSHx:  She has a past surgical history that includes appendectomy; tonsillectomy; cabg; hysterectomy; removal gallbladder; and appendectomy.   FHx:  Her family history includes Diabetes in her daughter and father; Heart Disorder in her father and mother; Hypertension in her daughter; Lipids in her daughter; Obesity in her daughter.   SHx: She reports that she has quit smoking. Her smoking use included cigarettes. She has a 44 pack-year smoking history. She has never used smokeless tobacco. She reports that she does not drink alcohol and does not use drugs.    Allergies: She is allergic to codeine, reglan [metoclopramide], vasotec [enalapril], and losartan.     Current Outpatient Medications   Medication Instructions    albuterol 108 (90 Base) MCG/ACT Inhalation Aero Soln 2 puffs, Every 4 hours PRN    aspirin 81 mg, Daily    atorvastatin (LIPITOR) 80 mg, Oral, Daily (RT)    BREO ELLIPTA 100-25 MCG/ACT Inhalation Aerosol Powder, Breath Activated 1 puff, Daily    Cholecalciferol (VITAMIN D3) 1000 UNITS Oral Cap 2 tablets, Daily    CLOPIDOGREL 75 MG Oral Tab TAKE 1 TABLET BY MOUTH DAILY    cyanocobalamin (VITAMIN B12) 50 mcg, Daily    diazePAM (VALIUM) 5 MG Oral Tab Take 1 tablet 1 hour before procedure; may repeat 1 hour later as needed.    FUROSEMIDE 40 MG Oral Tab TAKE 1 TABLET(40 MG) BY MOUTH DAILY    gabapentin (NEURONTIN) 300 mg, Nightly    HYDROcodone-acetaminophen (NORCO) 5-325 MG Oral Tab 0.5 tablets, Oral, Every 6 hours PRN    HYDROcodone-acetaminophen 5-325 MG Oral Tab 0.5 tablets, Oral, Every 6 hours PRN    LORazepam (ATIVAN) 0.5 mg, Oral, Nightly PRN    losartan (COZAAR) 25 mg, Daily    magnesium oxide (MAG-OX) 400 mg, Daily    metFORMIN 500 MG Oral Tab TAKE 1 TABLET BY MOUTH TWICE DAILY WITH MEALS    metoprolol succinate ER (TOPROL XL)  12.5 mg, Oral, Daily    Misc Natural Products (GLUCOSAMINE CHOND COMPLEX/MSM OR) Take  by mouth.    Misc. Devices (CANE) Does not apply Misc Dx: left knee pain.  Please use for ambulation for 1-2 weeks.    MULTIVITAMIN TAB/CAP 1 tablet, Daily    ONETOUCH ULTRA In Vitro Strip TEST BLOOD SUGAR ONCE DAILY    pantoprazole (PROTONIX) 40 mg, Oral, Every morning before breakfast    ranolazine ER (RANEXA) 500 mg, Oral, 2 times daily    Saccharomyces boulardii (FLORASTOR OR) No dose, route, or frequency recorded.    traMADol (ULTRAM) 50 mg, Oral, Every 6 hours PRN       PHYSICAL EXAMINATION   Vitals: BP (!) 170/80   Pulse 65   Temp 98.2 °F (36.8 °C) (Oral)   Resp 20   Ht 5' 1\" (1.549 m)   Wt 111 lb (50.3 kg)   SpO2 99%   BMI 20.97 kg/m²   Gen: NAD, well nourished, facial xantham  Eyes: PERRLA, normal conjunctivae  ENMT: Moist mucous membranes, trachea midline, no pharyngeal erythema, no thyromegaly  CV: RRR, no M/R/G, no peripheral edema  Resp: CTAB, non-labored respirations, symmetric expansion  GI: Soft, NT, ND, + BS, no rebound, no guarding  MSK:  No C/C/E, normal active/passive ROM in C-spine, 5/5 strength in all extremities.   Skin: Facial xanthem  Neuro: CN 2-12 grossly intact, sensation intact   Psych: A&Ox3, appropriate mood, conversant w/ linear thought process     DATA REVIEW: LABORATORY VALUES & DIAGNOSTICS  Recent Labs   Lab 11/20/24  0946      K 4.6      CO2 29.0   BUN 25*   CREATSERUM 0.75   ANIONGAP 9   *   CA 10.3   EGFRCR 79     Recent Labs   Lab 11/20/24  0946   WBC 13.0*   HGB 12.8   HCT 40.0   .0   MCV 95.9   MOPERCENT 4.5   EOPERCENT 1.0   BAPERCENT 0.2     ASSESSMENT & PLAN   Annita Mendieta - 84 year old female w COPD, CAD, recent COPD exacerbation. Presents to ED 11/20/2024. Dr. Tuttle in EM asked for IM eval of weakness, general malaise, fatigue.     FTT  malaise  Generalized weakness  Deconditioning  - Suspect related to recent copd exacerbation, steroids, possibly  virus. No new chest pain or dyspnea, just general malaise/fatigue  - NEG/WNL: TSH, D-dimer, BMP, COVID/flu/RSV, Ua without infection, EKG/trop, CXR  - CBC w mild leukocytosis.   - BNP is lower than priors  - Recently had steroids increased - prefers to avoid increasing again. Breathing stable  - No fevers & no specific infection sx. Don't think abx indicated  - - Pt taking trelegy 3-4x daily instead of albuterol. Told her to use albuterol  - [facility] PT/OT/RN arranged  - OP f/u w PCP ~ Friday. Any time will work per pt/daughter  - OP f/u w pulm next week    We discussed admission. Currently I do not see medical reason to explain her symptoms that would require admission. She specifically denies changes in breathing or chest pain. We can admit for obs & PT/OT but patient prefers DC home.   D/w Dr. Tuttle  D/w ED CM - kindly agreed to assist in arranging [facility] PT/OT/RN - D/w ED CM. Orders/F2F signed  D/w [facility] coordination team to help arrange appt w patient's PCP & Pulm Dr. Terry    F/u:   - PCP:  Dalton Green MD ~ Friday  - Pulm: Dr. Magana ~ 1 wk    Available via epic secure chat or perfect serve 7A - 7P.    Patient was not admitted to the hospital. She was seen & discharged from the emergency room. 72 minutes spent on patient care. Time spent for chart review/summarization, obtaining history, physical examination, discussion with patient/family, d/w consultants, nursing, SW/CM. 50% of time for F2F, counseling, coordination of care.     Hemant Calderon MD   Internal Medicine - [facility] Hospitalist  [facility]

## 2025-01-06 ENCOUNTER — TELEPHONE (OUTPATIENT)
Dept: PHYSICAL MEDICINE AND REHAB | Facility: CLINIC | Age: 85
End: 2025-01-06

## 2025-01-06 NOTE — TELEPHONE ENCOUNTER
Patient called office asking if there is a sooner appointment with Dr Srinivasan then the scheduled 1/16/25. Informed that unfortunately that is the earliest appointment available, however the appointment is on the waitlist for any cancellations.     Patient verbalized understanding.

## 2025-01-16 ENCOUNTER — OFFICE VISIT (OUTPATIENT)
Dept: PHYSICAL MEDICINE AND REHAB | Facility: CLINIC | Age: 85
End: 2025-01-16
Payer: MEDICARE

## 2025-01-16 ENCOUNTER — TELEPHONE (OUTPATIENT)
Dept: PHYSICAL MEDICINE AND REHAB | Facility: CLINIC | Age: 85
End: 2025-01-16

## 2025-01-16 VITALS — WEIGHT: 111 LBS | BODY MASS INDEX: 20.96 KG/M2 | HEIGHT: 61 IN

## 2025-01-16 DIAGNOSIS — M48.061 SPINAL STENOSIS OF LUMBAR REGION WITHOUT NEUROGENIC CLAUDICATION: ICD-10-CM

## 2025-01-16 DIAGNOSIS — M35.3 PMR (POLYMYALGIA RHEUMATICA) (HCC): ICD-10-CM

## 2025-01-16 DIAGNOSIS — M54.16 LUMBAR RADICULOPATHY: ICD-10-CM

## 2025-01-16 DIAGNOSIS — E11.29 TYPE 2 DIABETES MELLITUS WITH MICROALBUMINURIA (HCC): ICD-10-CM

## 2025-01-16 DIAGNOSIS — M48.061 LUMBAR FORAMINAL STENOSIS: ICD-10-CM

## 2025-01-16 DIAGNOSIS — M43.10 RETROLISTHESIS: ICD-10-CM

## 2025-01-16 DIAGNOSIS — M51.9 LUMBAR DISC DISEASE: Primary | ICD-10-CM

## 2025-01-16 DIAGNOSIS — M54.16 LUMBAR RADICULOPATHY: Primary | ICD-10-CM

## 2025-01-16 DIAGNOSIS — R80.9 TYPE 2 DIABETES MELLITUS WITH MICROALBUMINURIA (HCC): ICD-10-CM

## 2025-01-16 PROCEDURE — 99214 OFFICE O/P EST MOD 30 MIN: CPT | Performed by: PHYSICAL MEDICINE & REHABILITATION

## 2025-01-16 RX ORDER — GABAPENTIN 100 MG/1
100 CAPSULE ORAL 3 TIMES DAILY
Qty: 90 CAPSULE | Refills: 5 | Status: SHIPPED | OUTPATIENT
Start: 2025-01-16 | End: 2026-01-16

## 2025-01-16 NOTE — PROGRESS NOTES
Low Back Pain H & P    Chief Complaint:   Chief Complaint   Patient presents with    Follow - Up     LOV 10/14/24 pt is here for a follow up on back pain. Was given a Bilateral L4 transforaminal epidural steroid injection 11/1. States the pain has gotten progressively worse starting mid December . No N/T. Takes tylenol and tramadol. No current physical therapy. Reports pain to be constant aching that radiates into her buttock area into the groin which causes her difficulty walking. Pain 8/10     Nursing note reviewed and verified.    Patient was last seen on 10/14/2024.  I did the bilateral L4 TFESI's on 11/1/2024 which helped 100% for the first 3 weeks and then it gradually returned and the pain was bad just before Raffaele.      Description of the Pain  The pain is located in the right > left low back.    The pain radiates to the right > left buttock and bilateral groin..  The pain at its best is 3/10. The pain at its worst is 10/10. The pain is currently  4/10.  The pain is described as a(n) electrical sensation.    The pain is worse sitting, standing, walking, in the morning, and having a bowel movement .    The pain is better in the afternoon and in the evening.  There is no numbness.  There is no tingling in the legs.  There is weakness in both legs, left > right.     Past Medical History   Past Medical History:    Atherosclerosis of coronary artery    Castañeda's esophagus without dysplasia    CAD (coronary artery disease)    s/p stent placement 2004;77 year old female with PMHx CAD s/p PCI in 2004/2005, s/p CABG 2V (LIMA-RI, GEORGINA-RCA) 8/2017, s/p GERARDO to LM 3/22/2018, recent LHC on 3/29/2018 s/p POBA to pRCA who presents for staged PCI of RCA.    Congestive heart disease (HCC)    COPD (chronic obstructive pulmonary disease) (HCC)    Coronary atherosclerosis    Essential hypertension    GERD (gastroesophageal reflux disease)    H/O hammer toe correction    Hearing impairment    High blood pressure    High  cholesterol    HTN (hypertension)    Hyperlipidemia    Lung cancer (HCC)    Osteoarthritis    S/P appendectomy    S/p bilateral blepharoplasty    S/P CABG x 2    S/P cholecystectomy    S/P tonsillectomy and adenoidectomy    SNHL (sensorineural hearing loss)    bilateral hearing aids    Tobacco use    Type II or unspecified type diabetes mellitus without mention of complication, not stated as uncontrolled    Varicose veins       Past Surgical History   Past Surgical History:   Procedure Laterality Date    Appendectomy      Appendectomy      Cabg      Hysterectomy      Removal gallbladder      Tonsillectomy         Family History   Family History   Problem Relation Age of Onset    Heart Disorder Father     Diabetes Father     Heart Disorder Mother     Hypertension Daughter     Lipids Daughter     Obesity Daughter     Diabetes Daughter        Social History   Social History     Socioeconomic History    Marital status:      Spouse name: Not on file    Number of children: Not on file    Years of education: Not on file    Highest education level: Not on file   Occupational History    Occupation: retired   Tobacco Use    Smoking status: Former     Current packs/day: 0.80     Average packs/day: 0.8 packs/day for 55.0 years (44.0 ttl pk-yrs)     Types: Cigarettes    Smokeless tobacco: Never   Vaping Use    Vaping status: Never Used   Substance and Sexual Activity    Alcohol use: No     Alcohol/week: 0.0 standard drinks of alcohol    Drug use: No    Sexual activity: Not Currently   Other Topics Concern     Service Not Asked    Blood Transfusions Not Asked    Caffeine Concern Not Asked    Occupational Exposure Not Asked    Hobby Hazards Not Asked    Sleep Concern Not Asked    Stress Concern Not Asked    Weight Concern Not Asked    Special Diet Not Asked    Back Care Not Asked    Exercise Yes    Bike Helmet Not Asked    Seat Belt Not Asked    Self-Exams Not Asked   Social History Narrative    .  Lives  alone.    25 grandchildren.     Social Drivers of Health     Financial Resource Strain: Not on file   Food Insecurity: Not on file   Transportation Needs: Not on file   Physical Activity: Not on file   Stress: Not on file   Social Connections: Not on file   Housing Stability: Not on file       PE:  The patient does appear in her stated age in no distress.  The patient is well groomed.    Psychiatric:  The patient is alert and oriented x 3.  The patient has a normal affect and mood.      Respiratory:  No acute respiratory distress. Patient does not have a cough.    HEENT:  Extraocular muscles are intact. There is no kern icterus. Pupils are equal, round, and reactive to light. No redness or discharge bilaterally.    Skin:  There are no rashes or lesions.    Vitals:  There were no vitals filed for this visit.    Vascular lower extremity:   Dorsalis pedis pulse-RIGHT 2+   Dorsalis pedis pulse-LEFT 2+   Tibialis posterior pulse-RIGHT 2+   Tibialis posterior pulse-LEFT 2+     Neurological Lower Extremity:    Light Touch Sensation: Intact in bilateral Lower Extremities   LE Muscle Strength: All LE strength measurements 3+/5   RIGHT plantar reflexes: downward response   LEFT plantar reflexes: downward response   Reflexes: 2+ in bilateral lower extremities except:  Right Achilles tendon which are absent  Left Achilles tendon which are absent     Assessment  1. L1-2 mild-mod diffuse & right mod foraminal, L2-3 mod diffuse, L3-4 mod-large diffuse, L4-5 mod diffuse, L5-S1 bilateral mild-mod far lateral & mild central bulging discs    2. L5-S1 left mild-mod, L4-5 left mild/right mod, L3-4 left mod-severe/right mild foraminal stenosis    3. L3-4 severe, L4-5 mod-severe spinal stenosis    4. right L3 and bilateral L5-S1 radiculopathy, left L3 radiculitis    5. L1-2 grade 1, L2-3 grade 1, L3-4 grade 1 Retrolisthesis    6. Type 2 diabetes mellitus with microalbuminuria ([facility])    7. PMR (polymyalgia rheumatica) ([facility])         Plan  I  will perform bilateral L4 TFESI(s).    I will fill out the form for a parking placard.    The patient will continue with her home exercise program.    She will continue with the Biofreeze patch.    She will use the Ultram as needed.    She will try the gabapentin 100 mg up to 3 times a day for the pain.    The patient will follow up in 3-4 months, but the patient will call me 2 weeks after having the injection to let me know how the injection worked.    The patient understands and agrees with the stated plan.  Derrick Srinivasan MD  1/16/2025

## 2025-01-16 NOTE — PATIENT INSTRUCTIONS
Plan  I will perform bilateral L4 TFESI(s).    I will fill out the form for a parking placard.    The patient will continue with her home exercise program.    She will continue with the Biofreeze patch.    She will use the Ultram as needed.    She will try the gabapentin 100 mg up to 3 times a day for the pain.    The patient will follow up in 3-4 months, but the patient will call me 2 weeks after having the injection to let me know how the injection worked.

## 2025-01-16 NOTE — TELEPHONE ENCOUNTER
Per CMS Guidelines -no authorization is required for Bilateral L4 TFESI under fluoroscopic guidance  CPT code: 36337-54       Status: Authorization is not required based on medical necessity however is not a guarantee of payment and may be subject to review once claim is submitted-Covered Benefit.  This will be #3 in a rolling 12 month period

## 2025-01-20 NOTE — TELEPHONE ENCOUNTER
[facility] 2nd attempt-- AC Sent to Dr. Connor.     Saved tentative day on 1/31/2025

## 2025-01-21 ENCOUNTER — TELEPHONE (OUTPATIENT)
Dept: PHYSICAL MEDICINE AND REHAB | Facility: CLINIC | Age: 85
End: 2025-01-21

## 2025-01-21 NOTE — TELEPHONE ENCOUNTER
Pt called and was told to give condition update on gabapentin 100 MG Oral Cap - still in a lot of pain- 8/10 pain level. Please call to advise.

## 2025-01-22 NOTE — TELEPHONE ENCOUNTER
Per  at last office visit 1/16/25: \"She will continue with the Biofreeze patch.  She will use the Ultram as needed.  She will try the gabapentin 100 mg up to 3 times a day for the pain. \"    Spoke with patient who stated the Ultram and gabapentin are not helping her pain at all. Gabapentin was started on 1/17/25. Also reports experiencing no side effects.     Patient also asked for status update regarding her injection. Reviewed update from 1/16/25 encounter- waiting on anti-coag hold clearance. Advise patient to continue to take blood thinner as prescribed until clearance is received. Patient understood.    Message forwarded on to  for additional recommendations for possible medication adjustments.

## 2025-01-23 ENCOUNTER — PATIENT MESSAGE (OUTPATIENT)
Dept: PHYSICAL MEDICINE AND REHAB | Facility: CLINIC | Age: 85
End: 2025-01-23

## 2025-01-23 NOTE — TELEPHONE ENCOUNTER
Also per Dr. Srinivasan in TE 01/16/2025: \"Please follow up on the clearance.  Ok to increase gabapentin to 200 mg TID.  She can take 4-6 of the ultram per day for the pain if needed.\"

## 2025-01-23 NOTE — TELEPHONE ENCOUNTER
S/w Annita who verbalized understanding and was thankful for the call.     Patient states she only has 8 tablets of Tramadol left, so she will need a refill. See TE 01/16/2025 for pended refill as patient already spoke with Navigator today. Pt has no further questions and verbalized understanding for all new instructions and hold medications for upcoming injection.

## 2025-01-23 NOTE — TELEPHONE ENCOUNTER
She will need to increase the gabapentin to 200 mg TID.  Please follow up on her anticoagulation hold.

## 2025-02-19 NOTE — ED QUICK NOTES
MD at bedside.    Pt states has been getting increasingly weaker/fatigued for the past couple of weeks, worse this past week. States saw PMD  for shortness of breath and was started on Prednisone. States has not helped.

## 2025-03-10 ENCOUNTER — OFFICE VISIT (OUTPATIENT)
Dept: OTOLARYNGOLOGY | Facility: CLINIC | Age: 85
End: 2025-03-10

## 2025-03-10 DIAGNOSIS — J34.2 NASAL SEPTAL DEVIATION: ICD-10-CM

## 2025-03-10 DIAGNOSIS — J30.0 VASOMOTOR RHINITIS: Primary | ICD-10-CM

## 2025-03-10 RX ORDER — IPRATROPIUM BROMIDE 42 UG/1
2 SPRAY, METERED NASAL 2 TIMES DAILY
Qty: 1 EACH | Refills: 5 | Status: SHIPPED | OUTPATIENT
Start: 2025-03-10

## 2025-03-10 NOTE — PROGRESS NOTES
Annita Mendieta is a 84 year old female.   Chief Complaint   Patient presents with    Nose Problem     Reports runny nose      HPI:   84-year-old presents with right anterior clear rhinorrhea from both sides.  Happens when she eats and also randomly or intermittently throughout the day    Current Outpatient Medications   Medication Sig Dispense Refill    traMADol 50 MG Oral Tab Take 1 tablet (50 mg total) by mouth 6 (six) times daily. 180 tablet 0    gabapentin 100 MG Oral Cap Take 1 capsule (100 mg total) by mouth 3 (three) times daily. 90 capsule 5    Acetaminophen ER (TYLENOL 8 HOUR) 650 MG Oral Tab CR Take 2 tablets (1,300 mg total) by mouth 2 (two) times daily.      Multiple Vitamins-Minerals (MULTI-VITAMIN/MINERALS) Oral Tab Take 1 tablet by mouth daily.      fluticasone-umeclidin-vilant (TRELEGY ELLIPTA) 100-62.5-25 MCG/ACT Inhalation Aerosol Powder, Breath Activated Inhale 1 puff into the lungs daily.      albuterol 108 (90 Base) MCG/ACT Inhalation Aero Soln Inhale 2 puffs into the lungs every 4 (four) hours as needed.      gabapentin 300 MG Oral Cap Take 1 capsule (300 mg total) by mouth nightly.      metFORMIN 500 MG Oral Tab TAKE 1 TABLET BY MOUTH TWICE DAILY WITH MEALS 180 tablet 1    CLOPIDOGREL 75 MG Oral Tab TAKE 1 TABLET BY MOUTH DAILY 90 tablet 2    traMADol HCl 50 MG Oral Tab Take 1 tablet (50 mg total) by mouth every 6 (six) hours as needed for Pain. 10 tablet 0    Metoprolol Succinate ER 25 MG Oral Tablet 24 Hr Take 0.5 tablets (12.5 mg total) by mouth daily. 45 tablet 3    atorvastatin 80 MG Oral Tab Take 1 tablet (80 mg total) by mouth once daily. 90 tablet 3    Losartan Potassium 25 MG Oral Tab Take 1 tablet (25 mg total) by mouth daily.  0    magnesium oxide 400 MG Oral Tab Take 1 tablet (400 mg total) by mouth daily.      Cholecalciferol (VITAMIN D3) 1000 UNITS Oral Cap Take 1 tablet by mouth every other day.      aspirin 81 MG Oral Tab Take 1 tablet (81 mg total) by mouth daily.       predniSONE 5 MG Oral Tab Take 1 tablet (5 mg total) by mouth daily. (Patient not taking: Reported on 3/10/2025)      predniSONE 1 MG Oral Tab Take by mouth As Directed. (Patient not taking: Reported on 3/10/2025)      FUROSEMIDE 40 MG Oral Tab TAKE 1 TABLET(40 MG) BY MOUTH DAILY (Patient not taking: Reported on 3/10/2025) 90 tablet 1      Past Medical History:    Atherosclerosis of coronary artery    Castañeda's esophagus without dysplasia    CAD (coronary artery disease)    s/p stent placement 2004;77 year old female with PMHx CAD s/p PCI in 2004/2005, s/p CABG 2V (LIMA-RI, GEORGINA-RCA) 8/2017, s/p GERARDO to LM 3/22/2018, recent LHC on 3/29/2018 s/p POBA to pRCA who presents for staged PCI of RCA.    Congestive heart disease (HCC)    COPD (chronic obstructive pulmonary disease) (HCC)    Coronary atherosclerosis    Essential hypertension    GERD (gastroesophageal reflux disease)    H/O hammer toe correction    Hearing impairment    High blood pressure    High cholesterol    HTN (hypertension)    Hyperlipidemia    Lung cancer (HCC)    Osteoarthritis    S/P appendectomy    S/p bilateral blepharoplasty    S/P CABG x 2    S/P cholecystectomy    S/P tonsillectomy and adenoidectomy    SNHL (sensorineural hearing loss)    bilateral hearing aids    Tobacco use    Type II or unspecified type diabetes mellitus without mention of complication, not stated as uncontrolled    Varicose veins      Social History:  Social History     Socioeconomic History    Marital status:    Occupational History    Occupation: retired   Tobacco Use    Smoking status: Former     Current packs/day: 0.80     Average packs/day: 0.8 packs/day for 55.0 years (44.0 ttl pk-yrs)     Types: Cigarettes    Smokeless tobacco: Never   Vaping Use    Vaping status: Never Used   Substance and Sexual Activity    Alcohol use: No     Alcohol/week: 0.0 standard drinks of alcohol    Drug use: No    Sexual activity: Not Currently   Other Topics Concern    Exercise Yes    Social History Narrative    .  Lives alone.    25 grandchildren.      Past Surgical History:   Procedure Laterality Date    Appendectomy      Appendectomy      Cabg      Hysterectomy      Removal gallbladder      Tonsillectomy           EXAM:   There were no vitals taken for this visit.    System Details   Skin Inspection - Normal.   Constitutional Overall appearance - Normal.   Head/Face Symmetric, TMJ tenderness not present    Eyes EOMI, PERRL   Right ear:  Canal clear, TM intact, no JASMIN   Left ear:  Canal clear, TM intact, no JASMIN   Nose: Septum midline, inferior turbinates not enlarged, nasal valves without collapse    Oral cavity/Oropharynx: No lesions or masses on inspection or palpation, tonsils symmetric    Neck: Soft without LAD, thyroid not enlarged  Voice clear/ no stridor   Other:      SCOPES AND PROCEDURES:     Nasal Endoscopy Procedure Note     Due to inability for adequate examination of the nose and nasopharynx and need for magnification to perform the examination, endoscopy was performed.  Risks and benefits were discussed with patient/family and they have given verbal consent to proceed.    Pre-operative Diagnosis:   1. Vasomotor rhinitis        Post-operative Diagnosis: Same    Procedure: Diagnostic nasal endoscopy    Anesthesia: Topical anesthetic [place]     Surgeon Jarrett Hadley MD    EBL: 0cc    Procedure Detail & Findings:     After placement of topical anesthetic intranasally the endoscope was inserted into each nares and driven through the nasal cavity into the nasopharynx. The following findings were noted:    Septum: Deviated to the right  Inferior turbinates: Normal  Middle meatus: Patent  Middle turbinates: Normal  Purulence: None noted  Polyps: None noted  Nasopharynx and eustachian tube: No masses  Other: The middle and superior meatus, the turbinates, and the spheno-ethmoid recess were inspected and seen to be without significant abnormal findings.     Condition:  Stable    Complications: Patient tolerated the procedure well with no immediate complication.    Jarrett Hadley MD    AUDIOGRAM AND IMAGING:         IMPRESSION:   1. Vasomotor rhinitis       Recommendations:  -No abnormalities on exam besides a septal deviation and this issue likely represents a vasomotor rhinitis in the elderly  -Will trial on Atrovent nasal spray twice a day primarily before eating  -If she has issues she can return    The patient indicates understanding of these issues and agrees to the plan.      Jarrett Hadley MD  3/10/2025  12:36 PM

## 2025-05-08 ENCOUNTER — OFFICE VISIT (OUTPATIENT)
Dept: PHYSICAL MEDICINE AND REHAB | Facility: CLINIC | Age: 85
End: 2025-05-08
Payer: MEDICARE

## 2025-05-08 ENCOUNTER — HOSPITAL ENCOUNTER (OUTPATIENT)
Dept: GENERAL RADIOLOGY | Facility: HOSPITAL | Age: 85
Discharge: HOME | End: 2025-05-08
Attending: PHYSICAL MEDICINE & REHABILITATION
Payer: MEDICARE

## 2025-05-08 ENCOUNTER — TELEPHONE (OUTPATIENT)
Dept: PHYSICAL MEDICINE AND REHAB | Facility: CLINIC | Age: 85
End: 2025-05-08

## 2025-05-08 VITALS — WEIGHT: 111 LBS | BODY MASS INDEX: 21 KG/M2

## 2025-05-08 DIAGNOSIS — M25.512 CHRONIC LEFT SHOULDER PAIN: ICD-10-CM

## 2025-05-08 DIAGNOSIS — I50.22 CHRONIC SYSTOLIC CONGESTIVE HEART FAILURE (HCC): ICD-10-CM

## 2025-05-08 DIAGNOSIS — G89.29 CHRONIC LEFT SHOULDER PAIN: ICD-10-CM

## 2025-05-08 DIAGNOSIS — R80.9 TYPE 2 DIABETES MELLITUS WITH MICROALBUMINURIA (HCC): ICD-10-CM

## 2025-05-08 DIAGNOSIS — M48.061 LUMBAR FORAMINAL STENOSIS: ICD-10-CM

## 2025-05-08 DIAGNOSIS — M54.16 LUMBAR RADICULOPATHY: ICD-10-CM

## 2025-05-08 DIAGNOSIS — M75.121 NONTRAUMATIC COMPLETE TEAR OF RIGHT ROTATOR CUFF: ICD-10-CM

## 2025-05-08 DIAGNOSIS — M35.3 PMR (POLYMYALGIA RHEUMATICA) (HCC): ICD-10-CM

## 2025-05-08 DIAGNOSIS — E11.29 TYPE 2 DIABETES MELLITUS WITH MICROALBUMINURIA (HCC): ICD-10-CM

## 2025-05-08 DIAGNOSIS — M43.10 RETROLISTHESIS: ICD-10-CM

## 2025-05-08 DIAGNOSIS — K22.70 BARRETT'S ESOPHAGUS WITHOUT DYSPLASIA: ICD-10-CM

## 2025-05-08 DIAGNOSIS — M48.061 SPINAL STENOSIS OF LUMBAR REGION WITHOUT NEUROGENIC CLAUDICATION: Primary | ICD-10-CM

## 2025-05-08 DIAGNOSIS — M51.9 LUMBAR DISC DISEASE: ICD-10-CM

## 2025-05-08 DIAGNOSIS — M19.011 PRIMARY OSTEOARTHRITIS OF RIGHT SHOULDER: ICD-10-CM

## 2025-05-08 PROCEDURE — 99499 UNLISTED E&M SERVICE: CPT | Performed by: PHYSICAL MEDICINE & REHABILITATION

## 2025-05-08 PROCEDURE — 99214 OFFICE O/P EST MOD 30 MIN: CPT | Performed by: PHYSICAL MEDICINE & REHABILITATION

## 2025-05-08 PROCEDURE — 73030 X-RAY EXAM OF SHOULDER: CPT | Performed by: PHYSICAL MEDICINE & REHABILITATION

## 2025-05-08 RX ORDER — TRAMADOL HYDROCHLORIDE 50 MG/1
50 TABLET ORAL EVERY 6 HOURS PRN
Qty: 10 TABLET | Refills: 0 | Status: SHIPPED | OUTPATIENT
Start: 2025-05-08

## 2025-05-08 NOTE — PROGRESS NOTES
Low Back Pain H & P    Chief Complaint:   Chief Complaint   Patient presents with    Follow - Up     1/31/25 bilateral L4 TFESI.   80% relief until about two weeks ago. Pain is mostly in groin & effects ability to walk. Requesting another epidural. CPL: 2/10. Taking tramadol at least twice a day with minimal relief (needs refill). Gabapentin 300mg BID.            Nursing note reviewed and verified.      Patient was last seen on 1/16/2025.  I did the bilateral L4 TFESI's on 1/31/2025 which helped her 80% until 10-14 days ago when the pain started to return.  She is now having right low back pain and right lateral hip and thigh pain.  She will have the bilateral groin pain in the morning.  This pain and the low back pain improve by the evening.  She will walk and do the HEP 6 days a week.      Description of the Pain  The pain at its best is 3/10. The pain at its worst is 10/10. The pain is currently  3/10.  The back pain is described as a(n) aching and electrical sensation in the groin areas.    The pain is worse standing and walking.    The pain is better sitting and laying down.  There is no numbness.  There is no tingling in the legs.  There is not weakness in both legs.     She has some balance issues.    She developed left base of the neck pain  and left arm pain with radiation into the wrist.  She did have one morning where the whole left arm was numb.  She has a left arm tremor, but denies any weakness.  Neck range of motion can affect the left arm pain.    Past Medical History   Past Medical History[1]    Past Surgical History   Past Surgical History[2]    Family History   Family History[3]    Social History   Social History     Socioeconomic History    Marital status:      Spouse name: Not on file    Number of children: Not on file    Years of education: Not on file    Highest education level: Not on file   Occupational History    Occupation: retired   Tobacco Use    Smoking status: Former     Current  packs/day: 0.80     Average packs/day: 0.8 packs/day for 55.0 years (44.0 ttl pk-yrs)     Types: Cigarettes    Smokeless tobacco: Never   Vaping Use    Vaping status: Never Used   Substance and Sexual Activity    Alcohol use: No     Alcohol/week: 0.0 standard drinks of alcohol    Drug use: No    Sexual activity: Not Currently   Other Topics Concern     Service Not Asked    Blood Transfusions Not Asked    Caffeine Concern Not Asked    Occupational Exposure Not Asked    Hobby Hazards Not Asked    Sleep Concern Not Asked    Stress Concern Not Asked    Weight Concern Not Asked    Special Diet Not Asked    Back Care Not Asked    Exercise Yes    Bike Helmet Not Asked    Seat Belt Not Asked    Self-Exams Not Asked   Social History Narrative    .  Lives alone.    25 grandchildren.     Social Drivers of Health     Food Insecurity: Not on file   Transportation Needs: Not on file   Stress: Not on file   Housing Stability: Not on file       PE:  The patient does appear in her stated age in no distress.  The patient is well groomed.    Psychiatric:  The patient is alert and oriented x 3.  The patient has a normal affect and mood.      Respiratory:  No acute respiratory distress. Patient does not have a cough.    HEENT:  Extraocular muscles are intact. There is no kern icterus. Pupils are equal, round, and reactive to light. No redness or discharge bilaterally.    Skin:  There are no rashes or lesions.    Lymph Nodes:  The patient has no palpable submandibular, supraclavicular, and cervical lymph nodes..    Vitals:  There were no vitals filed for this visit.    Cervical Spine:    Posture: moderate-severe chin forward superiorly rotated protracted shoulder posture.   Shoulders: Level   Head: In neutral   Spinous Processes Palpations: Non-tender for all Spinous Processes   Z-Joints Palpations: Non-tender for all Z-joints   Muscular Palpations: Non-tender to palpation.     Shoulder: The shoulders are stable.    Medial  Border Scapular Winging: absent   Right Scapula: laterally displaced   Left Scapula: laterally displaced   Palpation: Non-tender shoulder palpations.   Right Internal Rotation: normal   Left Internal Rotation: normal   Right External Rotation: normal   Left External Rotation: normal   Shoulder Special Tests: Right Alex test: Negative  Left Alex test: Positive  Right external rotation: Negative  Left external rotation: Positive     Vascular upper extremity:   Right radial pulses: 2+   Left radial pulses: 2+      Neurological Upper Extremity:    Light Touch: Intact in Bilateral upper extremities.   Pin Prick: Not tested.   UE Muscle Strength: All Upper Extremity strength measurements 5/5 except:  ABP Right: 4+/5  ABP Left: 4+/5  Wrist Extensors Right: 4+/5  Triceps Right: 4+/5  Shoulder external rotators Right: 3/5  Shoulder internal rotator Right: 3/5   Reflexes: 2+ In the bilateral upper extremities.   Shepard's sign Right: Negative   Shepard's sigh Left: Negative     Gait:    Gait: Wide Based   Sit to Stand: no difficulty      Lumbar Spine:    Scoliosis: Thoracic levoscoliosis that is moderate and Lumbar dextroscoliosis that is moderate     Lumbar Spine Palpation:    Spinous Processes: Tender at L4, L5, and S1   Z-joints: Tender at  right L4-5   SIJ: Non-tender for bilateral SIJ   Piriformis Muscle: Non-tender bilateral Piriformis muscles   Greater Trochanteric Bursa: Non-tender for bilateral Greater Trochanteric Bursa     Vascular lower extremity:   Dorsalis pedis pulse-RIGHT 2+   Dorsalis pedis pulse-LEFT 2+   Tibialis posterior pulse-RIGHT 2+   Tibialis posterior pulse-LEFT 2+     Neurological Lower Extremity:    Light Touch Sensation: Intact in bilateral Lower Extremities   LE Muscle Strength: All LE strength measurements 5/5 except:  Hip Flexion RIGHT:  3+/5  Hip Flexion LEFT:   3+/5   RIGHT plantar reflexes: downward response   LEFT plantar reflexes: downward response   Reflexes: 2+ in bilateral lower  extremities     Assessment  1. L3-4 severe, L4-5 mod-severe spinal stenosis    2. L5-S1 left mild-mod, L4-5 left mild/right mod, L3-4 left mod-severe/right mild foraminal stenosis    3. L1-2 mild-mod diffuse & right mod foraminal, L2-3 mod diffuse, L3-4 mod-large diffuse, L4-5 mod diffuse, L5-S1 bilateral mild-mod far lateral & mild central bulging discs    4. L1-2 grade 1, L2-3 grade 1, L3-4 grade 1 Retrolisthesis    5. Castañeda's esophagus without dysplasia    6. PMR (polymyalgia rheumatica) ([facility])    7. Type 2 diabetes mellitus with microalbuminuria ([facility])    8. Chronic systolic congestive heart failure ([facility])    9. Primary osteoarthritis of right shoulder: mild-moderate glenohumeral joinjt and moderate AC joint    10. Nontraumatic complete tear of right rotator cuff    11. Chronic left shoulder pain    12. bilateral L3 radiculopathies and bilateral L5-S1 radiculitis           Medications    traMADol 50 MG Oral Tab        Plan  Orders Placed This Encounter   Procedures    XR SHOULDER, COMPLETE (MIN 2 VIEWS), LEFT (CPT=73030)     AP/lateral/y-views     Standing Status:   Future     Expected Date:   5/8/2025     Expiration Date:   5/8/2026     Scheduling Instructions:      To schedule a test at any [facility] call Central Scheduling at       [phone].    Lumbar Transforaminal Epidural Injection     Standing Status:   Future     Expected Date:   5/8/2025     Expiration Date:   11/7/2025     Number of Levels::   1     Laterality::   Bilateral     Lumbar Levels::   L4 (L4-5)     CPT Code:   69899     Imaging Needed::   Fluoroscopy     Type of Sedation::   Local     Release to patient:   Immediate     She might need to have a left shoulder injection about 2 weeks after she has had the bilateral L4 TFESi's if she is still having the left shoulder and arm pain at that time.    The patient will continue with her home exercise program.    The patient will follow up in 3 months, but the patient  will call me 2 weeks after having the injection to let me know how the injection worked.    She will take the Ultram for the pain as needed.    Patient should call prior to next visit if new or worsening symptoms.     The patient understands and agrees with the stated plan.  Derrick Srinivasan MD  5/8/2025         [1]   Past Medical History:   Atherosclerosis of coronary artery    Castañeda's esophagus without dysplasia    CAD (coronary artery disease)    s/p stent placement 2004;77 year old female with PMHx CAD s/p PCI in 2004/2005, s/p CABG 2V (LIMA-RI, GEORGINA-RCA) 8/2017, s/p GERARDO to LM 3/22/2018, recent LHC on 3/29/2018 s/p POBA to pRCA who presents for staged PCI of RCA.    Congestive heart disease (HCC)    COPD (chronic obstructive pulmonary disease) (HCC)    Coronary atherosclerosis    Essential hypertension    GERD (gastroesophageal reflux disease)    H/O hammer toe correction    Hearing impairment    High blood pressure    High cholesterol    HTN (hypertension)    Hyperlipidemia    Lung cancer (HCC)    Osteoarthritis    S/P appendectomy    S/p bilateral blepharoplasty    S/P CABG x 2    S/P cholecystectomy    S/P tonsillectomy and adenoidectomy    SNHL (sensorineural hearing loss)    bilateral hearing aids    Tobacco use    Type II or unspecified type diabetes mellitus without mention of complication, not stated as uncontrolled    Varicose veins   [2]   Past Surgical History:  Procedure Laterality Date    Appendectomy      Appendectomy      Cabg      Hysterectomy      Removal gallbladder      Tonsillectomy     [3]   Family History  Problem Relation Age of Onset    Heart Disorder Father     Diabetes Father     Heart Disorder Mother     Hypertension Daughter     Lipids Daughter     Obesity Daughter     Diabetes Daughter

## 2025-05-08 NOTE — TELEPHONE ENCOUNTER
Per CMS Guidelines -no authorization is required for Bilateral L4 TFESI under fluoroscopic guidance.  CPT code: 56989     Status: Authorization is not required based on medical necessity however is not a guarantee of payment and may be subject to review once claim is submitted.

## 2025-05-08 NOTE — PATIENT INSTRUCTIONS
She might need to have a left shoulder injection about 2 weeks after she has had the bilateral L4 TFESi's if she is still having the left shoulder and arm pain at that time.    The patient will continue with her home exercise program.    The patient will follow up in 3 months, but the patient will call me 2 weeks after having the injection to let me know how the injection worked.    She will take the Ultram for the pain as needed.    Patient should call prior to next visit if new or worsening symptoms.

## 2025-05-08 NOTE — TELEPHONE ENCOUNTER
Status of Anticoag  [x] Clearance pending to hold Plavix 7 days prior to Bilateral L4 Transforaminal Epidural Steroid Injection faxed to Dr. Luis Armando Connor F: [phone]  [] Clearance approved   [] Clearance denied  [] Clearance modified

## 2025-05-21 PROBLEM — G25.0 ESSENTIAL TREMOR: Status: ACTIVE | Noted: 2025-04-15

## 2025-05-22 ENCOUNTER — TELEPHONE (OUTPATIENT)
Dept: PHYSICAL MEDICINE AND REHAB | Facility: CLINIC | Age: 85
End: 2025-05-22

## 2025-05-22 NOTE — TELEPHONE ENCOUNTER
Daughter called and is having procedure 5/30/25 and needs diazepam- please send to pharmacy- please call to advise.

## 2025-05-28 NOTE — TELEPHONE ENCOUNTER
Confirmed [facility] will be providing this to patient.   Request was also submitted to [facility] by SEVERIANO Snider patient navigator.    Called daughter back and informed her of the above.

## 2025-05-28 NOTE — TELEPHONE ENCOUNTER
Spoke with patient's daughter and informed her the surgery center has been providing the diazepam from to the injections.     Informed daughter I will double check with our  to verify details and will call her back.     Daughter asked to be called back at phone #: [phone].

## 2025-05-30 PROBLEM — M19.012 PRIMARY OSTEOARTHRITIS OF LEFT SHOULDER: Status: ACTIVE | Noted: 2025-05-30

## 2025-06-09 ENCOUNTER — TELEPHONE (OUTPATIENT)
Dept: PHYSICAL MEDICINE AND REHAB | Facility: CLINIC | Age: 85
End: 2025-06-09

## 2025-06-09 DIAGNOSIS — M25.512 CHRONIC LEFT SHOULDER PAIN: Primary | ICD-10-CM

## 2025-06-09 DIAGNOSIS — G89.29 CHRONIC LEFT SHOULDER PAIN: Primary | ICD-10-CM

## 2025-06-09 NOTE — TELEPHONE ENCOUNTER
Patient had Bilateral L4 Transforaminal Epidural Steroid Injection on 5/30/25.    Per patient the injection has helped some. Pain prior to the injection was 8-9/10 and pain is now 4-5/10. States the last couple of days have been bad, but she thinks that may have been weather related. Today doesn't seem as bad.     Informed patient may need to give it an additional week to see the full effect of the injection. Patient understood.    FYI sent to .

## 2025-06-17 ENCOUNTER — TELEPHONE (OUTPATIENT)
Dept: PHYSICAL MEDICINE AND REHAB | Facility: CLINIC | Age: 85
End: 2025-06-17

## 2025-06-19 NOTE — TELEPHONE ENCOUNTER
Per 6/17/25 tele encounter - Pt called and still having pain- level 5. Worst in the morning.- please call to advise.     Pt reports today \"It seems that these rainy days, the pain is more than regular days\" & that \"the mornings are the worst\". Taking two tylenol in the morning that brings pain from 8/10 to 5/10.     Gabapentin - Tries to take it \"every day or every other day\". Sometimes takes three times in a day, sometimes she forgets and takes one in a day. \"Helps a little bit\". RN educated patient on medication adherence and taking three times a day every day as prescribed. This can help promote pain relief. Patient verbalized understanding and agreed to start taking medication as prescribed.     Pain prior to epidural - 15/10  Current max pain - 8/10  Pain at its best - 5/10    Dr. Srinivasan's 5/8/25 LOV note plan:   She might need to have a left shoulder injection about 2 weeks after she has had the bilateral L4 TFESi's if she is still having the left shoulder and arm pain at that time.   - \"The pain seems to have gotten a little better in the last week\". Patient does not wish to do another injection at this time.   The patient will continue with her home exercise program.    - Continuing HEP & walking - making progress.   The patient will follow up in 3 months, but the patient will call me 2 weeks after having the injection to let me know how the injection worked.     She will take the Ultram for the pain as needed.  - ran out \"quite a while ago\" - only had it for a day or two after the epidural. Patient states this provided great relief and would like a refill if able.         Tramadol refill pended and routed to Dr. Srinivasan for review and signature if appropriate.

## 2025-07-02 RX ORDER — TRAMADOL HYDROCHLORIDE 50 MG/1
50 TABLET ORAL EVERY 6 HOURS PRN
Qty: 15 TABLET | Refills: 0 | Status: SHIPPED | OUTPATIENT
Start: 2025-07-02

## 2025-07-04 ENCOUNTER — APPOINTMENT (OUTPATIENT)
Dept: CT IMAGING | Facility: HOSPITAL | Age: 85
End: 2025-07-04
Attending: EMERGENCY MEDICINE
Payer: MEDICARE

## 2025-07-04 ENCOUNTER — HOSPITAL ENCOUNTER (EMERGENCY)
Facility: HOSPITAL | Age: 85
Discharge: HOME | End: 2025-07-04
Attending: EMERGENCY MEDICINE
Payer: MEDICARE

## 2025-07-04 VITALS
BODY MASS INDEX: 21.79 KG/M2 | HEIGHT: 60 IN | RESPIRATION RATE: 16 BRPM | HEART RATE: 77 BPM | OXYGEN SATURATION: 100 % | DIASTOLIC BLOOD PRESSURE: 70 MMHG | TEMPERATURE: 99 F | SYSTOLIC BLOOD PRESSURE: 142 MMHG | WEIGHT: 111 LBS

## 2025-07-04 DIAGNOSIS — S16.1XXA STRAIN OF NECK MUSCLE, INITIAL ENCOUNTER: ICD-10-CM

## 2025-07-04 DIAGNOSIS — S09.90XA INJURY OF HEAD, INITIAL ENCOUNTER: Primary | ICD-10-CM

## 2025-07-04 PROCEDURE — 99284 EMERGENCY DEPT VISIT MOD MDM: CPT

## 2025-07-04 PROCEDURE — 72125 CT NECK SPINE W/O DYE: CPT | Performed by: RADIOLOGY

## 2025-07-04 PROCEDURE — 70450 CT HEAD/BRAIN W/O DYE: CPT | Performed by: RADIOLOGY

## 2025-07-05 NOTE — ED PROVIDER NOTES
Patient Seen in: [facility] Emergency Department    History     Chief Complaint   Patient presents with    Trauma 1 & 2       HPI    Patient presents to the ED after falling about 45 minutes ago.  She states she lost her balance and fell backwards and hit the back of her head on the ground.  Denies LOC or vomiting.  On Plavix.    History reviewed. Past Medical History[1]    History reviewed. Past Surgical History[2]      Medications :  Prescriptions Prior to Admission[3]     Family History[4]    Smoking Status: Social Hx on file[5]    Constitutional and vital signs reviewed.      Social History and Family History elements reviewed from today, pertinent positives to the presenting problem noted.    Physical Exam     ED Triage Vitals   BP 07/04/25 1347 (!) 168/70   Pulse 07/04/25 1347 80   Resp 07/04/25 1347 20   Temp 07/04/25 1515 98.9 °F (37.2 °C)   Temp src 07/04/25 1515 Temporal   SpO2 07/04/25 1347 98 %   O2 Device 07/04/25 1347 None (Room air)       All measures to prevent infection transmission during my interaction with the patient were taken. Handwashing was performed prior to and after the exam.  Stethoscope and any equipment used during my examination was cleaned with super sani-cloth germicidal wipes following the exam.     Physical Exam  Vitals and nursing note reviewed.   Constitutional:       General: She is not in acute distress.     Appearance: She is well-developed. She is not ill-appearing or toxic-appearing.   HENT:      Head: Normocephalic.      Comments: Contusion and small hematoma to the occipital scalp  Eyes:      General:         Right eye: No discharge.         Left eye: No discharge.      Conjunctiva/sclera: Conjunctivae normal.   Neck:      Trachea: No tracheal deviation.   Cardiovascular:      Rate and Rhythm: Normal rate.   Pulmonary:      Effort: Pulmonary effort is normal. No respiratory distress.      Breath sounds: No stridor.   Abdominal:      General: There is no distension.       Palpations: Abdomen is soft.   Musculoskeletal:         General: No tenderness or deformity.   Skin:     General: Skin is warm and dry.   Neurological:      Mental Status: She is alert and oriented to person, place, and time.   Psychiatric:         Mood and Affect: Mood normal.         Behavior: Behavior normal.         ED Course      Labs Reviewed - No data to display    As Interpreted by me    Imaging Results Available and Reviewed while in ED: CT SPINE CERVICAL (CPT=72125)  Result Date: 7/4/2025  CONCLUSION: No acute fracture or subluxation.  Electronically Verified and Signed by Attending Radiologist: David Chao MD 7/4/2025 4:07 PM Workstation: [employer]    CT BRAIN OR HEAD (CPT=70450)  Result Date: 7/4/2025  CONCLUSION:  1.  No acute intracranial finding. 2.  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 3.  Old right basal ganglionic lacunar infarct. 4.  Advanced changes of chronic small vessel disease in the cerebral white matter. 5.  Old left frontal subcortical infarct with possible cortical extension. 6.  Large vessel intracranial atherosclerosis. 7.  Large right occipital scalp hematoma. Electronically Verified and Signed by Attending Radiologist: David Chao MD 7/4/2025 4:00 PM Workstation: [employer]    ED Medications Administered: Medications - No data to display      MDM     Vitals:    07/04/25 1347 07/04/25 1515 07/04/25 1624   BP: (!) 168/70 139/66 142/70   Pulse: 80 61 77   Resp: 20  16   Temp:  98.9 °F (37.2 °C)    TempSrc:  Temporal    SpO2: 98% 97% 100%   Weight: 50.3 kg     Height: 152.4 cm (5')       *I personally reviewed and interpreted all ED vitals.    Pulse Ox: 100%, Room air, Normal     Monitor Interpretation:   normal sinus rhythm as interpreted by me.  The cardiac monitor was ordered to monitor heart rate.    Differential Diagnosis/ Diagnostic Considerations: ICH, head injury, cervical spine fracture, other    Complicating Factors: The patient already has does not have any  pertinent problems on file. to contribute to the complexity of this ED evaluation.    Medical Decision Making  Patient presents to the ED with a head and neck injury.  Nondistressed on examination.  Alert and oriented.  CT brain and cervical spine without acute injury.  Patient reassured and stable for discharge with outpatient follow-up    Problems Addressed:  Injury of head, initial encounter: acute illness or injury that poses a threat to life or bodily functions  Strain of neck muscle, initial encounter: acute illness or injury    Amount and/or Complexity of Data Reviewed  Radiology: ordered and independent interpretation performed. Decision-making details documented in ED Course.     Details: I personally viewed the patient's CT brain and noted no ICH        Condition upon leaving the department: Stable    Disposition and Plan     Clinical Impression:  1. Injury of head, initial encounter    2. Strain of neck muscle, initial encounter        Disposition:  Discharge    Follow-up:  Dalton Green MD  [address] [place] [id number]  [phone]    Schedule an appointment as soon as possible for a visit in 3 day(s)        Medications Prescribed:  Discharge Medication List as of 7/4/2025  4:21 PM                           [1]   Past Medical History:   Atherosclerosis of coronary artery    Castañeda's esophagus without dysplasia    CAD (coronary artery disease)    s/p stent placement 2004;77 year old female with PMHx CAD s/p PCI in 2004/2005, s/p CABG 2V (LIMA-RI, GEORGINA-RCA) 8/2017, s/p GERARDO to LM 3/22/2018, recent LHC on 3/29/2018 s/p POBA to pRCA who presents for staged PCI of RCA.    Congestive heart disease (HCC)    COPD (chronic obstructive pulmonary disease) (HCC)    Coronary atherosclerosis    Essential hypertension    GERD (gastroesophageal reflux disease)    H/O hammer toe correction    Hearing impairment    High blood pressure    High cholesterol    HTN (hypertension)    Hyperlipidemia     Lung cancer (HCC)    3 yrs ago    Osteoarthritis    S/P appendectomy    S/p bilateral blepharoplasty    S/P CABG x 2    S/P cholecystectomy    S/P tonsillectomy and adenoidectomy    SNHL (sensorineural hearing loss)    bilateral hearing aids    Tobacco use    Type II or unspecified type diabetes mellitus without mention of complication, not stated as uncontrolled    Varicose veins   [2]   Past Surgical History:  Procedure Laterality Date    Appendectomy      Appendectomy      Cabg      Hysterectomy      Removal gallbladder      Tonsillectomy     [3] (Not in a hospital admission)  [4]   Family History  Problem Relation Age of Onset    Heart Disorder Father     Diabetes Father     Heart Disorder Mother     Hypertension Daughter     Lipids Daughter     Obesity Daughter     Diabetes Daughter    [5]   Social History  Socioeconomic History    Marital status:    Occupational History    Occupation: retired   Tobacco Use    Smoking status: Former     Current packs/day: 0.80     Average packs/day: 0.8 packs/day for 55.0 years (44.0 ttl pk-yrs)     Types: Cigarettes    Smokeless tobacco: Never   Vaping Use    Vaping status: Never Used   Substance and Sexual Activity    Alcohol use: No     Alcohol/week: 0.0 standard drinks of alcohol    Drug use: No    Sexual activity: Not Currently   Other Topics Concern    Exercise Yes

## 2025-07-15 ENCOUNTER — PATIENT MESSAGE (OUTPATIENT)
Dept: PHYSICAL MEDICINE AND REHAB | Facility: CLINIC | Age: 85
End: 2025-07-15

## 2025-07-23 NOTE — TELEPHONE ENCOUNTER
Per patient's daughter they will  the parking placard in the office.    Parking placard placed in front office for .

## 2025-07-23 NOTE — TELEPHONE ENCOUNTER
Jerome diallo pended for 's review/signature.     Once signed will reach out to patient to see if she would like this mailed to her home address or if she would like to pick it up in the office.

## 2025-08-14 ENCOUNTER — OFFICE VISIT (OUTPATIENT)
Dept: PHYSICAL MEDICINE AND REHAB | Facility: CLINIC | Age: 85
End: 2025-08-14

## 2025-08-14 VITALS — WEIGHT: 111 LBS | BODY MASS INDEX: 21.79 KG/M2 | HEIGHT: 60 IN

## 2025-08-14 DIAGNOSIS — M54.16 LUMBAR RADICULOPATHY: ICD-10-CM

## 2025-08-14 DIAGNOSIS — M35.3 PMR (POLYMYALGIA RHEUMATICA) (HCC): ICD-10-CM

## 2025-08-14 DIAGNOSIS — M54.42 CHRONIC BILATERAL LOW BACK PAIN WITH BILATERAL SCIATICA: ICD-10-CM

## 2025-08-14 DIAGNOSIS — M48.061 SPINAL STENOSIS OF LUMBAR REGION WITHOUT NEUROGENIC CLAUDICATION: ICD-10-CM

## 2025-08-14 DIAGNOSIS — G89.29 CHRONIC BILATERAL LOW BACK PAIN WITH BILATERAL SCIATICA: ICD-10-CM

## 2025-08-14 DIAGNOSIS — M54.41 CHRONIC BILATERAL LOW BACK PAIN WITH BILATERAL SCIATICA: ICD-10-CM

## 2025-08-14 DIAGNOSIS — M43.10 RETROLISTHESIS: ICD-10-CM

## 2025-08-14 DIAGNOSIS — M48.061 LUMBAR FORAMINAL STENOSIS: Primary | ICD-10-CM

## 2025-08-14 DIAGNOSIS — M51.9 LUMBAR DISC DISEASE: ICD-10-CM

## 2025-08-14 DIAGNOSIS — I10 ESSENTIAL HYPERTENSION: ICD-10-CM

## 2025-08-14 PROCEDURE — 99214 OFFICE O/P EST MOD 30 MIN: CPT | Performed by: PHYSICAL MEDICINE & REHABILITATION

## 2025-08-18 DIAGNOSIS — M25.512 CHRONIC LEFT SHOULDER PAIN: ICD-10-CM

## 2025-08-18 DIAGNOSIS — G89.29 CHRONIC LEFT SHOULDER PAIN: ICD-10-CM

## 2025-08-21 RX ORDER — TRAMADOL HYDROCHLORIDE 50 MG/1
50 TABLET ORAL EVERY 6 HOURS PRN
Qty: 15 TABLET | Refills: 0 | Status: SHIPPED | OUTPATIENT
Start: 2025-08-21

## (undated) NOTE — LETTER
5/8/2025      Derrick Srinivasan MD  Physical Medicine and Rehabilitation  [address] [place] [address], [address]  [facility] [address]  Dept: [phone]  Dept Fax: [phone]        RE: Consultation for Annita Mendieta        Dear Dalton Green MD,    Thank you very much for the opportunity to see your patient.  Attached please find a summary from your patient's recent visit.     I appreciate the chance to take care of your patient with you.  Please feel free to call me with any questions or concerns.    Sincerely,        Derrick Srinivasan MD  Electronically Signed on 5/8/2025

## (undated) NOTE — LETTER
[facility]     [] NEW APPLICANT  [address] [place] [address]  [x] RENEWAL            Persons with Disabilities Certification for Parking Placard  *This form is valid for three months from your physician's signature date for a Temporary Placard and six months for a Permanent Placard.    NOTE TO ALL DISABILITY LICENSE PLATE OWNERS:  If you have a disability license plate, you MUST complete the form and renew your placard.    DIRECTIONS: Both sides of this document must be signed and completed fully. All fields are required.   Applicants complete Part 1. If the applicant is a MINOR, then Parent/Guardian(s) MUST also complete Part 2. The applicant's medical professional MUSTcomplete Part 3. If the applicant is applying for meter-exempt parking, his/her medical professional MUST also complete Part 4.    PART 1:   Applicant Information (MUST have a valid [place] 's license and/or ID card)  I hereby certify  that I meet the definition of a person with a disability as provided in [facility] 5/1-159.1, and I certify  that my physical condition entitles me to the issuance of a Persons with Disabilities Parking Placard. By affixing my signature below, I understand that the parking placard may not be used unless I am the  or passenger of the vehicle.     *If a  , please provide a copy of your  showing proof of service.     Disability Parking Placard # (if any)     Full Name of Person with Disability (If minor, complete Part 2 also)    Annita Mendieta  Male/Female  female  Date of Birth   10/20/1940    Valid [place] ’s License or ID Card # of Applicant                                                                                    S 1 6 4 6 2 7 4 1 8 9 9   [place] Address  [address] [place] [address]    Mailing Address if Different from Above   Telephone Number  [phone] (home)  Email Address   [email or web address]  [place]?  Yes/No  No     Signature of Person with Disability Today’s Date  7/23/2025     PART 2:   For Parent or Legal Guardian (MUST have a valid 's license and/or ID card)  I hereby certify that the above applicant is a minor and I have primary responsibility for his/her transportation. By affixing my signature below, I understand that the disability placard is issued to the person with the disability and may not be used unless I am transporting the disabled person in the vehicle.     Name of Parent or Legal Guardian   Relationship to Person with Disability   Valid [place] ’s License or ID Card #          [place] Address Apt/Unit# City State Zip   Telephone Number Email Address   Signature of Parent or Legal Guardian Today’s Date  7/23/2025     Warning: Any misuse of the disability parking placard/plates or making a false application may result in the revocation of the placard, a 12-month suspension or revocation of your drivers license, and a fine of up to $1,000.    Temporary Disabled Parking Placard Applications -- May be taken to any [facility] facility or mailed in.  Permanent Disables Parking Placard Applications -- MUST be mailed to the following address:  , Persons with Disabilities Placard Unit, [address] [place] [address], [address], [place] [address].    *If you have a permanent disability placard and would like a Persons with Disabilities License Plate, please visit your local  facility to apply. You will need your permanent placard number and current plate number or NALDO.     Please complete Page 2 to ensure timely processing.    Printed by authority of the [facility]. July 2021 -- 1 -- VSD 62.28          Part 3: Medical Eligibility Standards and Medical Professionals Certification  As the medical professional(s) executing this document and verifying the nature of the applicant's disability, I understand that making a false representation of a person's  disability for the purposes of obtaining any type of a disabled parking placard may result in suspension or revocation of my license and a fine of up to $1,000. As a licensed physician, advanced practice nurse, optometrist, chiropractor or physician's assistant, I certify the applicant has a condition that constitutes him/her as a person with disabilities.   Length of Disability: (Check one)   [x]   Temporary Disability; the duration of this disability is _____6 months________________ (maximum 6 months)  []   Permanent Disability  []   Meter-Exempt Disability (Must complete and sign Part 4 also.)  Check all that apply (MUST check at least one):  []  Is restricted by a lung disease to such a degree that the person’s forced (respiratory) expiratory volume (FEV) for 1 second, when measured by spirometry, is less than 1 liter.  []   Uses a portable oxygen device.  []   Has a Class III or Class IV cardiac condition according to the standards set by the American Heart Association.  []   Cannot walk without the use of or assistance from a wheelchair, a walker, a crutch, a brace, a prosthetic device or another person.  [x]   Is severely limited in the ability to walk due to an arthritic, neurological, oncological or orthopedic condition.  []   Cannot walk 200 feet without stopping to rest because of one of the above five conditions.  Check all that apply: (MUST check at least one diagnosis):  []Amputation of extremity(s)_________________ [] Arthritis of the ______________________  []Spina Bifida     []Osteoarthritis of the __________________   []Multiple Sclerosis    [] Chronic Pain due to ___________________  []Quadriplegia/Paraplegia   [] Legally Blind with limited mobility  [] Cerebral Palsy  [x] Other Diagnosis: ____Chronic bilateral low back pain with bilateral sciatica__________________________________    If none of the above conditions apply, list the medical condition that impacts the person’s mobility.     Medical  Professional’s Printed Name*   Derrick Srinivasan MD Specialty  Physical Medicine and Rehabilitation   Office Address   [facility] [place]  [address]  [facility] [address]  Dept: [phone]  Dept Fax: [phone]   Medical Professional’s Signature   State Professional License Number (NOT NPI#)  [id number] Today’s Date                  7/23/2025    Signature of Collaborating/Supervising Physician (if signed above by resident/assistant) Supervising State Professional License Number             PART 4: Medical Eligibility for Meter-Exempt Parking  The meter-exempt parking certification must be completed only when the applicant qualifies. To qualify, the applicant MUST have a VALID  [place] 's license, have an ambulatory disability described in Part 3, and also have one of the following conditions listed below.  Economic need is not a consideration for meter-exempt parking    The applicant is eligible for meter-exempt parking as provided by statue due to the following PERMANENT medical condition or disability:    Check all that apply:  [] Cannot manage, manipulate, or insert coins, or obtain tickets in parking meters/ticket machines due to lack of fine motor control of BOTH hands.  [] Cannot reach above his/her head to a height of 42 inches from the ground due to a lack of finger, hand or upper-extremity strength or mobility.  [] Cannot approach a parking meter due to his/her use of a wheelchair or other device for mobility.  [] Cannot walk more than 20 feet due to an orthopedic, neurological, cardiovascular, or lung condition in which the degree of debilitation is so severe that it almost completely impedes the ability to walk.  [] Missing a hand(s) or arm(s) or has permanently lost the use of a hand or arm.  [] Patient is under 18 years of age and incapable of driving.     Medical Professional’s Signature State Professional License Number (NOT NPI#)   Today’s Date                   7/23/2025    Signature of Collaborating/Supervising Physician (if signed above by resident/assistant) Supervising State Professional License Number     FOR  OFFICE USE ONLY     Parking Placard Number:  Expiration Date:   Issued By: Issued Date:

## (undated) NOTE — LETTER
25        To Whom It May Concern:      Annita Mendieta  :  10/20/1940    []  Patient has been cleared to hold Plavix 7 days prior to Bilateral L4 Transforaminal Epidural Steroid Injection.  Holding the medication(s) may put the patient at an increased risk for stroke, heart attack, or neurologic or cardiac events.    []  Patient has NOT been cleared to hold Plavix for procedure.        X_________________________________________  (Provider signature)                                     (Date)      Please make a selection, sign and fax this letter back to our office    If this office may be of further assistance, please do not hesitate to contact us.      Sincerely,    Derrick Srinivasan MD    Phone #: [phone]  Fax #: [phone]

## (undated) NOTE — LETTER
[facility]     [] NEW APPLICANT  [address] [place] [address]  [] RENEWAL            Persons with Disabilities Certification for Parking Placard  *This form is valid for three months from your physician's signature date for a Temporary Placard and six months for a Permanent Placard.    NOTE TO ALL DISABILITY LICENSE PLATE OWNERS:  If you have a disability license plate, you MUST complete the form and renew your placard.    DIRECTIONS: Both sides of this document must be signed and completed fully. All fields are required.   Applicants complete Part 1. If the applicant is a MINOR, then Parent/Guardian(s) MUST also complete Part 2. The applicant's medical professional MUSTcomplete Part 3. If the applicant is applying for meter-exempt parking, his/her medical professional MUST also complete Part 4.    PART 1:   Applicant Information (MUST have a valid [place] 's license and/or ID card)  I hereby certify  that I meet the definition of a person with a disability as provided in [facility] 5/1-159.1, and I certify  that my physical condition entitles me to the issuance of a Persons with Disabilities Parking Placard. By affixing my signature below, I understand that the parking placard may not be used unless I am the  or passenger of the vehicle.     *If a  , please provide a copy of your  showing proof of service.     Disability Parking Placard # (if any)     Full Name of Person with Disability (If minor, complete Part 2 also)    Annita Mendieta  Male/Female  female  Date of Birth   10/20/1940    Valid [place] ’s License or ID Card # of Applicant                                                                                                  [place] Address  [address] [place] [address]    Mailing Address if Different from Above   Telephone Number  [phone] (home)  Email Address   [email or web address]  ? Yes/No     Signature  of Person with Disability Today’s Date  1/16/2025     PART 2:   For Parent or Legal Guardian (MUST have a valid 's license and/or ID card)  I hereby certify that the above applicant is a minor and I have primary responsibility for his/her transportation. By affixing my signature below, I understand that the disability placard is issued to the person with the disability and may not be used unless I am transporting the disabled person in the vehicle.     Name of Parent or Legal Guardian   Relationship to Person with Disability   Valid [place] ’s License or ID Card #          [place] Address Apt/Unit# City State Zip   Telephone Number Email Address   Signature of Parent or Legal Guardian Today’s Date  1/16/2025     Warning: Any misuse of the disability parking placard/plates or making a false application may result in the revocation of the placard, a 12-month suspension or revocation of your drivers license, and a fine of up to $1,000.    Temporary Disabled Parking Placard Applications -- May be taken to any [facility] facility or mailed in.  Permanent Disables Parking Placard Applications -- MUST be mailed to the following address:  , Persons with Disabilities Placard Unit, [address] [place] [address], [address], [place] [address].    *If you have a permanent disability placard and would like a Persons with Disabilities License Plate, please visit your local  facility to apply. You will need your permanent placard number and current plate number or NALDO.     Please complete Page 2 to ensure timely processing.    Printed by authority of the [facility]. July 2021 -- 1 -- VSD 62.28          Part 3: Medical Eligibility Standards and Medical Professionals Certification  As the medical professional(s) executing this document and verifying the nature of the applicant's disability, I understand that making a false representation of a person's disability for the purposes  of obtaining any type of a disabled parking placard may result in suspension or revocation of my license and a fine of up to $1,000. As a licensed physician, advanced practice nurse, optometrist, chiropractor or physician's assistant, I certify the applicant has a condition that constitutes him/her as a person with disabilities.   Length of Disability: (Check one)   [x]   Temporary Disability; the duration of this disability is _____6 months________________ (maximum 6 months)  []   Permanent Disability  []   Meter-Exempt Disability (Must complete and sign Part 4 also.)  Check all that apply (MUST check at least one):  []  Is restricted by a lung disease to such a degree that the person’s forced (respiratory) expiratory volume (FEV) for 1 second, when measured by spirometry, is less than 1 liter.  []   Uses a portable oxygen device.  []   Has a Class III or Class IV cardiac condition according to the standards set by the American Heart Association.  []   Cannot walk without the use of or assistance from a wheelchair, a walker, a crutch, a brace, a prosthetic device or another person.  []   Is severely limited in the ability to walk due to an arthritic, neurological, oncological or orthopedic condition.  []   Cannot walk 200 feet without stopping to rest because of one of the above five conditions.  Check all that apply: (MUST check at least one diagnosis):  []Amputation of extremity(s)_________________ [] Arthritis of the ______________________  []Spina Bifida     []Osteoarthritis of the __________________   []Multiple Sclerosis    [] Chronic Pain due to ___________________  []Quadriplegia/Paraplegia   [] Legally Blind with limited mobility  [] Cerebral Palsy  [x] Other Diagnosis: Chronic bilateral low back pain with bilateral sciatica     If none of the above conditions apply, list the medical condition that impacts the person’s mobility.     Medical Professional’s Printed Name*   Derrick Srinivasan MD  Specialty  Physiatry   Office Address   [facility] [place]  [address]  [facility] [address]  Dept: [phone]  Dept Fax: [phone]   Medical Professional’s Signature   State Professional License Number (NOT NPI#)  [id number] Today’s Date                  1/16/2025    Signature of Collaborating/Supervising Physician (if signed above by resident/assistant) Supervising State Professional License Number             PART 4: Medical Eligibility for Meter-Exempt Parking  The meter-exempt parking certification must be completed only when the applicant qualifies. To qualify, the applicant MUST have a VALID  [place] 's license, have an ambulatory disability described in Part 3, and also have one of the following conditions listed below.  Economic need is not a consideration for meter-exempt parking    The applicant is eligible for meter-exempt parking as provided by statue due to the following PERMANENT medical condition or disability:    Check all that apply:  [] Cannot manage, manipulate, or insert coins, or obtain tickets in parking meters/ticket machines due to lack of fine motor control of BOTH hands.  [] Cannot reach above his/her head to a height of 42 inches from the ground due to a lack of finger, hand or upper-extremity strength or mobility.  [] Cannot approach a parking meter due to his/her use of a wheelchair or other device for mobility.  [] Cannot walk more than 20 feet due to an orthopedic, neurological, cardiovascular, or lung condition in which the degree of debilitation is so severe that it almost completely impedes the ability to walk.  [] Missing a hand(s) or arm(s) or has permanently lost the use of a hand or arm.  [] Patient is under 18 years of age and incapable of driving.     Medical Professional’s Signature State Professional License Number (NOT NPI#)   Today’s Date                  1/16/2025    Signature of Collaborating/Supervising Physician  (if signed above by resident/assistant) Supervising State Professional License Number     FOR  OFFICE USE ONLY     Parking Placard Number:  Expiration Date:   Issued By: Issued Date:

## (undated) NOTE — LETTER
05/10/24        To Whom It May Concern:      Annita Mendieta  :  10/20/1940    []  Patient has been cleared to hold Plavix for 7 days prior to bilateral L5 transforaminal epidural steroid injection.  Holding the medication(s) may put the patient at an increased risk for stroke, heart attack, or neurologic or cardiac events.    []  Patient has NOT been cleared to hold Plavix for procedure.        X_________________________________________  (Provider signature)                                     (Date)      Please make a selection, sign and fax this letter back to our office    If this office may be of further assistance, please do not hesitate to contact us.      Sincerely,    Derrick Srinivasan MD    Phone #: [phone]  Fax #: [phone]

## (undated) NOTE — LETTER
1/16/2025      Derrick Srinivasan MD  Physical Medicine and Rehabilitation  [address] [place] [address], [address]  [facility] [address]  Dept: [phone]  Dept Fax: [phone]        RE: Consultation for Annita Mendieta        Dear Dalton Green MD,    Thank you very much for the opportunity to see your patient.  Attached please find a summary from your patient's recent visit.     I appreciate the chance to take care of your patient with you.  Please feel free to call me with any questions or concerns.    Sincerely,        Derrick Srinivasan MD  Electronically Signed on 1/16/2025

## (undated) NOTE — LETTER
24        To Whom It May Concern:      Annita Salinasdavidguzman  :  10/20/1940    []  Patient has been cleared to hold Plavix starting 7 days prior to Bilateral Lumbar 5 Transforaminal Epidural Steroid Injection.  Holding the medication(s) may put the patient at an increased risk for stroke, heart attack, or neurologic or cardiac events.    []  Patient has NOT been cleared to hold Plavix starting 7 days prior to Bilateral Lumbar 5 Transforaminal Epidural Steroid Injection.        X_________________________________________  (Provider signature)                                     (Date)      Please make a selection, sign and fax this letter back to our office    If this office may be of further assistance, please do not hesitate to contact us.      Sincerely,    Derrick Srinivasan MD    Phone #: [phone]  Fax #: [phone]

## (undated) NOTE — LETTER
10/14/2024      Derrick Srinivasan MD  Physical Medicine and Rehabilitation  [address] [place] [address], [address]  [facility] [address]  Dept: [phone]  Dept Fax: [phone]        RE: Consultation for Annita Mendieta        Dear Dalton Green MD,    Thank you very much for the opportunity to see your patient.  Attached please find a summary from your patient's recent visit.     I appreciate the chance to take care of your patient with you.  Please feel free to call me with any questions or concerns.    Sincerely,        Derrick Srinivasan MD  Electronically Signed on 10/14/2024

## (undated) NOTE — LETTER
8/12/2024      Derrick Srinivasan MD  Physical Medicine and Rehabilitation  [address] [place] [address], [address]  [facility] [address]  Dept: [phone]  Dept Fax: [phone]        RE: Consultation for Annita Mendieta        Dear Dalton Green MD,    Thank you very much for the opportunity to see your patient.  Attached please find a summary from your patient's recent visit.     I appreciate the chance to take care of your patient with you.  Please feel free to call me with any questions or concerns.    Sincerely,        Derrick Srinivasan MD  Electronically Signed on 8/12/2024

## (undated) NOTE — LETTER
5/9/2024      Derrick Srinivasan MD  Physical Medicine and Rehabilitation  [address] [place] [address], [address]  [facility] [address]  Dept: [phone]  Dept Fax: [phone]        RE: Consultation for Annita Mendieta        Dear Dalton Green MD,    Thank you very much for the opportunity to see your patient.  Attached please find a summary from your patient's recent visit.     I appreciate the chance to take care of your patient with you.  Please feel free to call me with any questions or concerns.    Sincerely,        Derrick Srinivasan MD  Electronically Signed on 5/9/2024

## (undated) NOTE — LETTER
AUTHORIZATION FOR SURGICAL OPERATION OR OTHER PROCEDURE    1.  I hereby authorize Dr. Amalia Baum  and [facility] staff assigned to my case to perform the following operation and/or procedure at the [facility]:    Cortisone injection in [place] Time:  ________ A. M.  P.M.        Patient Name:  ______________________________________________________  (please print)      Patient signature:  ___________________________________________________             Relationship to Patient:

## (undated) NOTE — LETTER
3/4/2024      Derrick Srinivasan MD  Physical Medicine and Rehabilitation  [address] [place] [address], [address]  [facility] [address]  Dept: [phone]  Dept Fax: [phone]        RE: Consultation for Annita Mendieta        Dear Dalton Green MD,    Thank you very much for the opportunity to see your patient.  Attached please find a summary from your patient's recent visit.     I appreciate the chance to take care of your patient with you.  Please feel free to call me with any questions or concerns.    Sincerely,        Derrick Srinivasan MD  Electronically Signed on 3/4/2024

## (undated) NOTE — LETTER
25        To Whom It May Concern:      Annita Mendieta  :  10/20/1940    []  Patient has been cleared to hold Plavix for 7 days prior to bilateral L4 transforaminal epidural steroid injection.  Holding the medication(s) may put the patient at an increased risk for stroke, heart attack, or neurologic or cardiac events.    []  Patient has NOT been cleared to hold Plavix for procedure.        X_________________________________________  (Provider signature)                                     (Date)      Please make a selection, sign and fax this letter back to our office    If this office may be of further assistance, please do not hesitate to contact us.      Sincerely,    Derrick Srinivasan MD    Phone #: [phone]  Fax #: [phone]